# Patient Record
Sex: FEMALE | Race: WHITE | NOT HISPANIC OR LATINO | Employment: OTHER | URBAN - METROPOLITAN AREA
[De-identification: names, ages, dates, MRNs, and addresses within clinical notes are randomized per-mention and may not be internally consistent; named-entity substitution may affect disease eponyms.]

---

## 2024-07-24 ENCOUNTER — OFFICE VISIT (OUTPATIENT)
Dept: NEPHROLOGY | Facility: CLINIC | Age: 64
End: 2024-07-24
Payer: COMMERCIAL

## 2024-07-24 VITALS
DIASTOLIC BLOOD PRESSURE: 80 MMHG | BODY MASS INDEX: 26.2 KG/M2 | SYSTOLIC BLOOD PRESSURE: 132 MMHG | HEIGHT: 66 IN | WEIGHT: 163 LBS

## 2024-07-24 DIAGNOSIS — E78.5 HYPERLIPIDEMIA, UNSPECIFIED HYPERLIPIDEMIA TYPE: ICD-10-CM

## 2024-07-24 DIAGNOSIS — E22.2 SIADH (SYNDROME OF INAPPROPRIATE ADH PRODUCTION) (HCC): ICD-10-CM

## 2024-07-24 DIAGNOSIS — N18.1 CKD (CHRONIC KIDNEY DISEASE) STAGE 1, GFR 90 ML/MIN OR GREATER: ICD-10-CM

## 2024-07-24 DIAGNOSIS — E87.1 CHRONIC HYPONATREMIA: Primary | ICD-10-CM

## 2024-07-24 DIAGNOSIS — E55.9 VITAMIN D DEFICIENCY: ICD-10-CM

## 2024-07-24 PROCEDURE — 99214 OFFICE O/P EST MOD 30 MIN: CPT | Performed by: INTERNAL MEDICINE

## 2024-07-24 NOTE — PROGRESS NOTES
Nephrology Follow up Consultation  Rose ESPOSITO 63 y.o. female MRN: [id number]            BACKGROUND:  Rose ESPOSITO is a 63 y.o.female who was referred by Chong López MD for evaluation of Follow-up and Hyponatremia  .      ASSESSMENT / PLAN:   63 y.o.  female with pmh of multiple co-morbidities including multiple sclerosis, colostomy reversal, SIADH, anemia, GERD, hypertension and chronic hyponatremia  presents to the office for follow-up.      chronic hyponatremia CKD stage 1:  Baseline sodium 131-139meq  Most recent sodium level was 130 meq at 10 AM on 6/20/2024,  Etiology of hyponatremia likely due to  medication induced (Trileptal, Cymbalta )+ SIADH + increased ADH secondary to pain  Reports has been on salt tab for more than 10 yrs and was on 8gm a day and stable and every time salt tablet dosage is decreased even slightly her serum sodium drops drastically.  Wishes to continue on the 8 g without any change.  I respect her decision risks benefits renal standpoint everything discussed in depth.  Discussed with patient as long as serum sodium stays above 130 she can continue her Cymbalta and Trileptal if continues to drop then we may need to consider changes in medication regimen  plasma osmolality = 269  urine osmolality = 302  urine sodium = 91  uric acid = 2.1  Chest x-ray negative no acute signs suggestive of SIADH  Hold off on pan screening as patient has 2 offensive drugs likely causing SIADH.  Currently on salt tablets 2 g p.o. every 6 hours   continue on fluid restriction of 1.5L/day  Strict I/O.  Continue blood work every 3 months  CT abdomen 3/8/2023 benign cysts on kidneys no acute pathology suggestive of malignancy.  Check renal function panel monthly for now  Baseline Creatinine of 0.5-0.7 mg/dL  Most recent creatinine is 0.53 Mg/dL on 6/20/24  - Clinically the patient appears to be euvolemic  - Recommend to avoid use of NSAIDs, nephrotoxins. Caution advised with regards to exposure to IV  contrast dye.   - Discussed with the patient in depth her renal status, including the possible etiologies for CKD.   - Advised the patient that when her GFR is close to 20mL/min then will start discussing about RRT(renal replacement therapy) options such as renal transplant, peritoneal dialysis and hemodialysis.   - Informed the patient about the various options for Renal Replacement therapy.  - Discussed with the patient how we need to work together to delay the progression of CKD with optimal BP control based on their age and co-morbidities and trying to reduce proteinuria by the use of anti-proteinuric agents.     Blood pressure:  BP Readings from Last 3 Encounters:   07/24/24 132/80   06/04/24 140/88   04/16/24 138/86     - Patient is on no medications.   -Did discuss possible addition of Lasix and decreasing salt tablets her blood pressure is elevated however she wishes to hold off I respect and agree with them for now  - Goal BP of < 130/80 based on age and comorbidities  - Instructed to follow low sodium (2gm)diet.    Hemoglobin:  - Goal Hb of 10-12 g/dL  - Most recent labs suggestive of 14.2 g/dL.   -Continue p.o. iron    CKD-MBD(Mineral Bone Disease):  - Based on patients CKD stage following is the goal of therapy.  - Maintain calcium phosphorus product of < 55.  - Continue patient on vitamin D 1000 units p.o. daily  -Check Vitamin D prior to next visit    Proteinuria:  - most recent UA from August 2023 positive protein will recheck concentrated specimen  - check protein creatinine ratio, UA  - currently on therapy for proteinuria with Lipitor    Lipids:  -On Lipitor  - goal LDL less than 70  - management per primary team    Trigeminal neuralgia:  - management as per Primary team  - on Cymbalta, Trileptal  -Discussed with patient as long as serum sodium stays above 130 she can continue these medications without adjustments    GERD:  - Management as per primary team  -On Pepcid    Nutrition:  - Encouraged  "patient to follow a renal diet comprising of moderate potassium, low phosphorus and protein restriction to 0.8gm/kg.  - Will check serum albumin with next blood work.     Followup:  - Patient is to follow-up in 12 months, with lab work to be performed every 3 months for now a few days prior to the next visit. Advised patient to call me in 10 days to review the results if they do not hear back from me, as I may have not received the results.    Lianet Leyva MD, [facility], 7/24/2024, 1:22 PM             SUBJECTIVE: 63 y.o. female presents to the office for follow-up presents with spouse feels well no complaints did have issues with pain within trigeminal neuralgia at the time when she had the blood work done likely contributed to increased ADH and sodium of 130.  Occasionally when she feels like her sodium is dropping she may take additional salt tablets close to 9gm a day.  Wishes not to make any further changes to medications at this time.    Review of Systems   Constitutional:  Negative for chills and fever.   HENT:  Negative for congestion.    Respiratory:  Negative for cough, shortness of breath and wheezing.    Cardiovascular:  Negative for leg swelling.   Gastrointestinal:  Negative for abdominal pain.   Genitourinary:  Negative for difficulty urinating and dysuria.   Musculoskeletal:  Negative for back pain.   Neurological:  Negative for dizziness and headaches.   Psychiatric/Behavioral:  Negative for agitation and confusion.    All other systems reviewed and are negative.      PAST MEDICAL HISTORY:  Past Medical History:   Diagnosis Date   • Anemia    • Angioedema 11/29/2022   • Balance problem     per pt related to her \"MS\"   • Bladder infection     1/3/23 per pt no longer a problem\"   • Braces as ambulation aid     right ankle/leg when walking   • Cellulitis of right foot 11/17/2022   • Cognitive impairment 11/17/2022    Some short-term impairment   • Colostomy in place (HCC)    • Essential hypertension " "2020   • Family history of breast cancer     per pt \"Mom  from it\"   • Gait instability    • GERD  2018   • History of CVA (cerebrovascular accident) 2022    4mini strokes found on MRI.  Not hospitalized.  Early .   • History of transfusion    • Hyperlipidemia 2020   • Hyponatremia    • IBS (irritable bowel syndrome)    • Ischemic colitis ([facility]) 2022   • Mini stroke    • Multiple sclerosis ([facility])    • Poor vision     Secondary to MS   • Right foot drop    • Risk for falls    • SIADH (syndrome of inappropriate ADH production) ([facility]) 2015   • Trigeminal neuralgia of right side of face 03/10/2014   • Vitamin D deficiency    • Walker as ambulation aid        PROBLEM LIST    Patient Active Problem List   Diagnosis   • IBS (irritable bowel syndrome)   • Multiple sclerosis   • SIADH (syndrome of inappropriate ADH production) ([facility])   • Vitamin D deficiency   • GERD    • Gait instability   • Hyperlipidemia   • History of CVA (cerebrovascular accident)   • Iron deficiency anemia   • Cognitive impairment   • Cavovarus deformity of foot, acquired, right   • Chronic anemia   • Trigeminal neuralgia   • Venous stasis dermatitis of right lower extremity   • Chronic hyponatremia   • CKD (chronic kidney disease) stage 1, GFR 90 ml/min or greater       PAST SURGICAL HISTORY:  Past Surgical History:   Procedure Laterality Date   • ABDOMINAL ADHESION SURGERY N/A 2023    Procedure: EXTENSIVE LYSIS OF ADHESIONS;  Surgeon: Odette Bonilla MD;  Location:  MAIN OR;  Service: General   • ACHILLES TENDON SURGERY Right 2023    Procedure: TENOTOMY PERCUTANEOUS ACHILLES, Talonavicular joint capsule release, posterior tibial tendon lengthening, application of circular frame;  Surgeon: James R Lachman, MD;  Location:  MAIN OR;  Service: Orthopedics   • ANKLE SURGERY     • COLON SURGERY  2023   • COLONOSCOPY N/A 2023    Procedure: INTRAOP FLEXIBLE COLONOSCOPY;  Surgeon: Odette" RADHA Bonilla MD;  [facility];  Service: General   • EXPLORATORY LAPAROTOMY W/ BOWEL RESECTION N/A 11/09/2022    Procedure: LAPAROTOMY EXPLORATORY W/ BOWEL RESECTION; APPLICATION ABTHERA VAC;  Surgeon: Heriberto Shay DO;  [facility];  Service: General   • EXTERNAL FIXATOR (EX FIX) REMOVAL Right 02/02/2023    Procedure: REMOVAL EXTERNAL FIXATOR (EX FIX) Right lower extremity;  Surgeon: James R Lachman, MD;  [facility];  Service: Orthopedics   • HIP SURGERY      jeri implanted right thigh and screw right hip-right knee screw   • LAPAROTOMY N/A 11/10/2022    Procedure: LAPAROTOMY EXPLORATORY- SECOND LOOK, ABDOMINAL WOUND CLOSURE, CREATION OF A COLOSTOMY;  Surgeon: Mateo Sargent MD;  [facility];  Service: General   • IN CREATE LESION STRTCTC PRQ NEUROLYTIC GASSERIAN Left 06/21/2016    Procedure:  V3 TRIGEMINAL RF RHIZOTOMY;  Surgeon: Bird Rosa MD;  [facility];  Service: Neurosurgery   • IN CREATE LESION STRTCTC PRQ NEUROLYTIC GASSERIAN Left 01/15/2019    Procedure: RHIZOTOMY TRIGEMINAL NERVES (V2 & V3), LEFT;  Surgeon: Ac Pemberton MD;  [facility];  Service: Neurosurgery   • IN CREATE LESION STRTCTC PRQ NEUROLYTIC GASSERIAN Right 04/02/2019    Procedure: RHIZOTOMY TRIGEMINAL NERVES (V2 & V3), RIGHT;  Surgeon: Ac Pemberton MD;  [facility];  Service: Neurosurgery   • IN DSTRJ TRIGEMINAL NRV SUPRAORB INFRAORB BRANCH Right 03/11/2016    Procedure: RIGHT SIDED  TRIGEMINAL V2 RADIOFREQUENCY RHIZOTOMY;  Surgeon: Bird Rosa MD;  [facility];  Service: Neurosurgery   • IN LAPS CLSR NTRSTM LG/SM INT W/RESCJ & ANASTOMOSIS N/A 8/9/2023    Procedure: LAPAROSCOPIC-CONVERTED TO OPEN CLOSURE COLOSTOMY, HEPATIC FLEXURE MOBILIZATION, COLORECTAL ANASTOMOSIS;  Surgeon: Odette Bonilla MD;  [facility];  Service: General   • IN SIGMOIDOSCOPY FLX DX W/COLLJ SPEC BR/WA IF PFRMD N/A 11/09/2022    Procedure: Flexible sigmoidoscopy,   "diagnostic colonoscopy;  Surgeon: Heriberto Shay DO;  [facility];  Service: General   • RHIZOTOMY Left    • TONSILLECTOMY AND ADENOIDECTOMY     • TUBAL LIGATION         SOCIAL HISTORY :   reports that she quit smoking about 34 years ago. Her smoking use included cigarettes. She started smoking about 39 years ago. She has a 2.5 pack-year smoking history. She has never used smokeless tobacco. She reports that she does not currently use alcohol. She reports that she does not use drugs.    FAMILY HISTORY:  Family History   Problem Relation Age of Onset   • Breast cancer Mother    • No Known Problems Daughter        ALLERGIES:  Allergies   Allergen Reactions   • Ace Inhibitors Angioedema     Hx of angioedema - unclear if caused by ACE inhibitors, but highly recommend avoiding.   • Avonex [Interferon Beta-1a] Other (See Comments)     Per pt \"Med for MS passed out-fainted\"   • Cephalosporins Swelling     DO NOT GIVE BETA LACTAMS.Patient with delayed angioedema, cefepime with potential cause.  Additionally other medications like Lovenox also considered. Then with second episode of angioedema, less intense than previous, again delayed response after again having a dose of Cefepime   • Penicillins Rash     DO NOT GIVE BETA LACTAMS. Extensive total body rash occurring after Zosyn. Patient reported previous rash, mild, with Keflex.   • Keppra [Levetiracetam] Itching and Other (See Comments)     syncope   • Bactrim [Sulfamethoxazole-Trimethoprim] Itching     Other reaction(s): Itchy all over and rash.   • Barium Sulfate Rash and Edema   • Keppra [Levetiracetam] Rash and Edema   • Vancomycin Hives     2/2023 admission patient developed delayed hypersensitivity with rash and then subsequent angioedema on 2/22/2023.  Higher suspicion for reaction to cephalosporins.  Could consider retrial of vancomycin in the future.           PHYSICAL EXAM:  Vitals:    07/24/24 1300   BP: 132/80   BP Location: Left arm   Patient " "Position: Sitting   Cuff Size: Large   Weight: 73.9 kg (163 lb)   Height: 5' 6\" (1.676 m)       Body mass index is 26.31 kg/m².    Physical Exam  Vitals reviewed.   Constitutional:       General: She is not in acute distress.     Appearance: Normal appearance. She is normal weight. She is not ill-appearing, toxic-appearing or diaphoretic.   HENT:      Head: Normocephalic and atraumatic.      Mouth/Throat:      Mouth: Mucous membranes are moist.      Pharynx: No oropharyngeal exudate.   Eyes:      General: No scleral icterus.  Cardiovascular:      Rate and Rhythm: Normal rate.   Pulmonary:      Effort: No respiratory distress.      Breath sounds: Normal breath sounds. No stridor. No wheezing.   Abdominal:      Palpations: There is no mass.      Tenderness: There is no abdominal tenderness. There is no right CVA tenderness or left CVA tenderness.   Musculoskeletal:         General: No swelling.      Cervical back: Normal range of motion. No rigidity.   Skin:     Coloration: Skin is not jaundiced.   Neurological:      General: No focal deficit present.      Mental Status: She is alert and oriented to person, place, and time. Mental status is at baseline.   Psychiatric:         Mood and Affect: Mood normal.         Behavior: Behavior normal.         LABORATORY DATA:     Results from last 6 Months   Lab Units 06/20/24  0958 04/11/24  1614 02/23/24  1642 02/17/24  0931 02/15/24  2204 02/15/24  1310 02/15/24  1306   WBC Thousand/uL  --   --   --  8.42  --  4.98  --    HEMOGLOBIN g/dL  --   --   --  14.2  --  12.7  --    I STAT HEMOGLOBIN g/dl  --   --   --   --   --   --  12.9   HEMATOCRIT %  --   --   --  41.8  --  36.4*  --    HEMATOCRIT, ISTAT %  --   --   --   --   --   --  38   PLATELETS Thousands/uL  --   --   --  342  --  291  --    POTASSIUM mmol/L 4.0 4.0 4.8 4.0   < > 5.1  --    CHLORIDE mmol/L 97 94* 96 95*   < > 92*  --    CO2 mmol/L 24 30 30 24   < > 21  --    CO2, I-STAT mmol/L  --   --   --   --   --   --  " 27   BUN mg/dL 8 10 7 6   < > 5  --    CREATININE mg/dL 0.53* 0.52* 0.50* 0.57*   < > 0.50*  --    CALCIUM mg/dL 9.8 9.8 10.1 9.4   < > 9.0  --    MAGNESIUM mg/dL  --   --   --   --   --  2.1  --    PHOSPHORUS mg/dL 3.3 3.4  --   --   --  3.2  --    GLUCOSE, ISTAT mg/dl  --   --   --   --   --   --  132    < > = values in this interval not displayed.          rest all reviewed    RADIOLOGY:  No orders to display     Rest all reviewed        MEDICATIONS:    Current Outpatient Medications:   •  acetaminophen (TYLENOL) 325 mg tablet, Take 2 tablets (650 mg total) by mouth every 6 (six) hours as needed for mild pain, Disp: , Rfl: 0  •  atorvastatin (LIPITOR) 40 mg tablet, TAKE 1 TABLET BY MOUTH EVERY DAY IN THE EVENING, Disp: 90 tablet, Rfl: 3  •  Banophen 25 MG capsule, TAKE 1 TABLET (25 MG TOTAL) BY MOUTH EVERY 8 (EIGHT) HOURS AS NEEDED FOR ITCHING OR ALLERGIES, Disp: 100 capsule, Rfl: 5  •  Bioflavonoid Products (Vitamin C ER) 1000-100 MG TBCR, Take 1,000 mg by mouth 1 (one) time, Disp: , Rfl:   •  Cholecalciferol (Vitamin D3) 1000 units CAPS, Take 1,000 Units by mouth 1 (one) time, Disp: , Rfl:   •  clopidogrel (PLAVIX) 75 mg tablet, TAKE 1 TABLET BY MOUTH EVERY DAY, Disp: 90 tablet, Rfl: 3  •  DULoxetine (CYMBALTA) 60 mg delayed release capsule, Take 1 capsule (60 mg total) by mouth daily, Disp: 90 capsule, Rfl: 3  •  famotidine (PEPCID) 20 mg tablet, TAKE 1 TABLET BY MOUTH TWICE A DAY, Disp: 180 tablet, Rfl: 3  •  ferrous sulfate 325 (65 Fe) mg tablet, 1 tablet every other day, Disp: 45 tablet, Rfl: 0  •  OXcarbazepine (TRILEPTAL) 600 mg tablet, Take 1 tablet (600 mg total) by mouth every 8 (eight) hours, Disp: 270 tablet, Rfl: 3  •  oxybutynin (DITROPAN) 5 mg tablet, TAKE 1 TABLET BY MOUTH TWICE A DAY, Disp: 180 tablet, Rfl: 1  •  sodium chloride 1 g tablet, Take 2 tablets (2 g total) by mouth every 6 (six) hours, Disp: , Rfl:   •  sodium chloride (OCEAN) 0.65 % nasal spray, 1 spray into each nostril every 4  "(four) hours (Patient not taking: Reported on 7/24/2024), Disp: 60 mL, Rfl: 0          Portions of the record may have been created with voice recognition software. Occasional wrong word or \"sound a like\" substitutions may have occurred due to the inherent limitations of voice recognition software. Read the chart carefully and recognize, using context, where substitutions have occurred.If you have any questions, please contact the dictating provider.      "

## 2024-07-24 NOTE — PATIENT INSTRUCTIONS
- get blood work every 3months    - Please call me in 10 days after having your blood work done to review the results if you do not hear back from me or my office, as I may have not received the results.  - please remember to perform blood work prior to the next visit.  - Please call if the blood pressure top number is greater than 140 or less than 110 consistently.  - Please call if you are gaining more than 2lbs in 2 days for adjustment of water pills.  ~ Please AVOID the following pain medications.  LIST OF NSAIDS (NONSTEROIDAL ANTI-INFLAMMATORY DRUGS) AND SUTTON-2 INHIBITORS    DIFLUNISAL (DOLOBID)  IBUPROFEN (MOTRIN, ADVIL)  FLURBIPROFEN (ANSAID)  KETOPROFEN (ORUDIS, ORUVAIL)  FENOPROFEN (NALFON)  NABUMETONE (RELAFEN)  PIROXICAM (FELDENE)  NAPROXEN (ALEVE, NAPROSYN, NAPRELAN, ANAPROX)  DICLOFENAC (VOLTAREN, CATAFLAM)  INDOMETHACIN (INDOCIN)  SULINDAC (CLINORIL)  TOLMETIN (TOLETIN)  ETODOLAC (LODINE)  MELOXICAM (MOBIC)  KETOROLAC (TORADOL)  OXAPROZIN (DAYPRO)  CELECOXIB (CELEBREX)

## 2024-08-05 ENCOUNTER — TELEPHONE (OUTPATIENT)
Dept: FAMILY MEDICINE CLINIC | Facility: CLINIC | Age: 64
End: 2024-08-05

## 2024-08-05 DIAGNOSIS — R39.9 UTI SYMPTOMS: Primary | ICD-10-CM

## 2024-08-05 NOTE — TELEPHONE ENCOUNTER
Patients spouse called and stated patient is experiencing UTI symptoms can he come and get the necessary items to bring back for his spouse to have her urine tested

## 2024-08-06 ENCOUNTER — LAB (OUTPATIENT)
Dept: LAB | Facility: CLINIC | Age: 64
End: 2024-08-06
Payer: COMMERCIAL

## 2024-08-06 DIAGNOSIS — R39.9 UTI SYMPTOMS: ICD-10-CM

## 2024-08-06 DIAGNOSIS — N30.01 ACUTE CYSTITIS WITH HEMATURIA: Primary | ICD-10-CM

## 2024-08-06 LAB
BACTERIA UR QL AUTO: ABNORMAL /HPF
BILIRUB UR QL STRIP: NEGATIVE
CLARITY UR: CLEAR
COLOR UR: ABNORMAL
GLUCOSE UR STRIP-MCNC: NEGATIVE MG/DL
HGB UR QL STRIP.AUTO: ABNORMAL
KETONES UR STRIP-MCNC: NEGATIVE MG/DL
LEUKOCYTE ESTERASE UR QL STRIP: ABNORMAL
MUCOUS THREADS UR QL AUTO: ABNORMAL
NITRITE UR QL STRIP: POSITIVE
NON-SQ EPI CELLS URNS QL MICRO: ABNORMAL /HPF
PH UR STRIP.AUTO: 7 [PH]
PROT UR STRIP-MCNC: ABNORMAL MG/DL
RBC #/AREA URNS AUTO: ABNORMAL /HPF
SP GR UR STRIP.AUTO: 1.01 (ref 1–1.03)
UROBILINOGEN UR STRIP-ACNC: <2 MG/DL
WBC #/AREA URNS AUTO: ABNORMAL /HPF

## 2024-08-06 PROCEDURE — 82043 UR ALBUMIN QUANTITATIVE: CPT

## 2024-08-06 PROCEDURE — 81001 URINALYSIS AUTO W/SCOPE: CPT

## 2024-08-06 PROCEDURE — 87186 SC STD MICRODIL/AGAR DIL: CPT

## 2024-08-06 PROCEDURE — 82570 ASSAY OF URINE CREATININE: CPT

## 2024-08-06 PROCEDURE — 87077 CULTURE AEROBIC IDENTIFY: CPT

## 2024-08-06 PROCEDURE — 87086 URINE CULTURE/COLONY COUNT: CPT

## 2024-08-06 RX ORDER — NITROFURANTOIN MACROCRYSTALS 100 MG/1
100 CAPSULE ORAL 2 TIMES DAILY
Qty: 14 CAPSULE | Refills: 0 | Status: SHIPPED | OUTPATIENT
Start: 2024-08-06 | End: 2024-08-13

## 2024-08-06 NOTE — RESULT ENCOUNTER NOTE
Advise that urinalysis looks like an infection is present.  Begin Macrobid 100 mg twice daily for 1 week pending the results of the urine culture.

## 2024-08-08 LAB — BACTERIA UR CULT: ABNORMAL

## 2024-09-02 DIAGNOSIS — G50.0 TRIGEMINAL NEURALGIA OF LEFT SIDE OF FACE: ICD-10-CM

## 2024-09-03 RX ORDER — OXCARBAZEPINE 600 MG/1
600 TABLET, FILM COATED ORAL EVERY 8 HOURS
Qty: 270 TABLET | Refills: 3 | Status: SHIPPED | OUTPATIENT
Start: 2024-09-03

## 2024-09-03 RX ORDER — OXCARBAZEPINE 600 MG/1
600 TABLET, FILM COATED ORAL EVERY 8 HOURS
Qty: 90 TABLET | Refills: 0 | Status: SHIPPED | OUTPATIENT
Start: 2024-09-03 | End: 2024-09-03 | Stop reason: SDUPTHER

## 2024-09-09 DIAGNOSIS — N32.81 OVERACTIVE BLADDER: ICD-10-CM

## 2024-09-09 RX ORDER — OXYBUTYNIN CHLORIDE 5 MG/1
5 TABLET ORAL 2 TIMES DAILY
Qty: 180 TABLET | Refills: 1 | Status: SHIPPED | OUTPATIENT
Start: 2024-09-09

## 2024-09-17 ENCOUNTER — OFFICE VISIT (OUTPATIENT)
Dept: FAMILY MEDICINE CLINIC | Facility: CLINIC | Age: 64
End: 2024-09-17
Payer: COMMERCIAL

## 2024-09-17 VITALS
TEMPERATURE: 98 F | OXYGEN SATURATION: 98 % | HEART RATE: 83 BPM | SYSTOLIC BLOOD PRESSURE: 170 MMHG | HEIGHT: 66 IN | RESPIRATION RATE: 18 BRPM | DIASTOLIC BLOOD PRESSURE: 90 MMHG | WEIGHT: 163 LBS | BODY MASS INDEX: 26.2 KG/M2

## 2024-09-17 DIAGNOSIS — E87.1 CHRONIC HYPONATREMIA: ICD-10-CM

## 2024-09-17 DIAGNOSIS — K21.9 GASTROESOPHAGEAL REFLUX DISEASE, UNSPECIFIED WHETHER ESOPHAGITIS PRESENT: Chronic | ICD-10-CM

## 2024-09-17 DIAGNOSIS — Z86.73 HISTORY OF CVA (CEREBROVASCULAR ACCIDENT): ICD-10-CM

## 2024-09-17 DIAGNOSIS — R26.81 GAIT INSTABILITY: ICD-10-CM

## 2024-09-17 DIAGNOSIS — Z23 NEED FOR COVID-19 VACCINE: ICD-10-CM

## 2024-09-17 DIAGNOSIS — G50.0 TRIGEMINAL NEURALGIA: Primary | ICD-10-CM

## 2024-09-17 DIAGNOSIS — E22.2 SIADH (SYNDROME OF INAPPROPRIATE ADH PRODUCTION) (HCC): ICD-10-CM

## 2024-09-17 PROCEDURE — 99214 OFFICE O/P EST MOD 30 MIN: CPT | Performed by: FAMILY MEDICINE

## 2024-09-17 PROCEDURE — 91320 SARSCV2 VAC 30MCG TRS-SUC IM: CPT

## 2024-09-17 PROCEDURE — 90480 ADMN SARSCOV2 VAC 1/ONLY CMP: CPT

## 2024-09-17 NOTE — PATIENT INSTRUCTIONS
Looks great.  Medications stay the same.  COVID update.  Flu shot in October.  Recheck in 3 months.

## 2024-09-17 NOTE — PROGRESS NOTES
"Chief Complaint   Patient presents with    Follow-up        HPI   Here for  follow-up of hypertension, GERD, hyponatremia, hyperlipidemia, MS, SI ADH, ambulatory dysfunction, and trigeminal neuralgia.  When last seen, was concerned about recurrence of trigeminal neuralgia but symptoms resolved.  States that she is doing well.  Walking with a walker and brace.  Notes that her ostomy site healed up well.  Does get rectal urgency.  Stools are not formed.  Did not like the generic form of Metamucil.  Currently on 9 tablets of sodium with improvement.        Past Medical History:   Diagnosis Date    Anemia     Angioedema 2022    Balance problem     per pt related to her \"MS\"    Bladder infection     1/3/23 per pt no longer a problem\"    Braces as ambulation aid     right ankle/leg when walking    Cellulitis of right foot 2022    Cognitive impairment 2022    Some short-term impairment    Colostomy in place ([facility])     Essential hypertension 2020    Family history of breast cancer     per pt \"Mom  from it\"    Gait instability     GERD  2018    History of CVA (cerebrovascular accident) 2022    4mini strokes found on MRI.  Not hospitalized.  Early .    History of transfusion     Hyperlipidemia 2020    Hyponatremia     IBS (irritable bowel syndrome)     Ischemic colitis ([facility]) 2022    Mini stroke     Multiple sclerosis ([facility])     Poor vision     Secondary to MS    Right foot drop     Risk for falls     SIADH (syndrome of inappropriate ADH production) ([facility]) 2015    Trigeminal neuralgia of right side of face 03/10/2014    Vitamin D deficiency     Walker as ambulation aid         Past Surgical History:   Procedure Laterality Date    ABDOMINAL ADHESION SURGERY N/A 2023    Procedure: EXTENSIVE LYSIS OF ADHESIONS;  Surgeon: Odette Bonilla MD;  [facility];  Service: General    ACHILLES TENDON SURGERY Right 2023    Procedure: TENOTOMY PERCUTANEOUS " ACHILLES, Talonavicular joint capsule release, posterior tibial tendon lengthening, application of circular frame;  Surgeon: James R Lachman, MD;  [facility];  Service: Orthopedics    ANKLE SURGERY      COLON SURGERY  11 09 2023    COLONOSCOPY N/A 8/9/2023    Procedure: INTRAOP FLEXIBLE COLONOSCOPY;  Surgeon: Odette Bonilla MD;  [facility];  Service: General    EXPLORATORY LAPAROTOMY W/ BOWEL RESECTION N/A 11/09/2022    Procedure: LAPAROTOMY EXPLORATORY W/ BOWEL RESECTION; APPLICATION ABTHERA VAC;  Surgeon: Heriberto Shay DO;  [facility];  Service: General    EXTERNAL FIXATOR (EX FIX) REMOVAL Right 02/02/2023    Procedure: REMOVAL EXTERNAL FIXATOR (EX FIX) Right lower extremity;  Surgeon: James R Lachman, MD;  [facility];  Service: Orthopedics    HIP SURGERY      jeri implanted right thigh and screw right hip-right knee screw    LAPAROTOMY N/A 11/10/2022    Procedure: LAPAROTOMY EXPLORATORY- SECOND LOOK, ABDOMINAL WOUND CLOSURE, CREATION OF A COLOSTOMY;  Surgeon: Mateo Sargent MD;  [facility];  Service: General    CO CREATE LESION STRTCTC PRQ NEUROLYTIC GASSERIAN Left 06/21/2016    Procedure:  V3 TRIGEMINAL RF RHIZOTOMY;  Surgeon: Bird Rosa MD;  [facility];  Service: Neurosurgery    CO CREATE LESION STRTCTC PRQ NEUROLYTIC GASSERIAN Left 01/15/2019    Procedure: RHIZOTOMY TRIGEMINAL NERVES (V2 & V3), LEFT;  Surgeon: Ac Pemberton MD;  [facility];  Service: Neurosurgery    CO CREATE LESION STRTCTC PRQ NEUROLYTIC GASSERIAN Right 04/02/2019    Procedure: RHIZOTOMY TRIGEMINAL NERVES (V2 & V3), RIGHT;  Surgeon: Ac Pemberton MD;  [facility];  Service: Neurosurgery    CO DSTRJ TRIGEMINAL NRV SUPRAORB INFRAORB BRANCH Right 03/11/2016    Procedure: RIGHT SIDED  TRIGEMINAL V2 RADIOFREQUENCY RHIZOTOMY;  Surgeon: Bird Rosa MD;  [facility];  Service: Neurosurgery    CO LAPS CLSR NTRSTM LG/SM INT W/RESCJ & ANASTOMOSIS  "N/A 2023    Procedure: LAPAROSCOPIC-CONVERTED TO OPEN CLOSURE COLOSTOMY, HEPATIC FLEXURE MOBILIZATION, COLORECTAL ANASTOMOSIS;  Surgeon: Odette Bonilla MD;  [facility];  Service: General    AZ SIGMOIDOSCOPY FLX DX W/COLLJ SPEC BR/WA IF PFRMD N/A 2022    Procedure: Flexible sigmoidoscopy,  diagnostic colonoscopy;  Surgeon: Heriberto Shay DO;  [facility];  Service: General    RHIZOTOMY Left     TONSILLECTOMY AND ADENOIDECTOMY      TUBAL LIGATION         Social History     Tobacco Use    Smoking status: Former     Current packs/day: 0.00     Average packs/day: 0.5 packs/day for 5.0 years (2.5 ttl pk-yrs)     Types: Cigarettes     Start date: 1985     Quit date: 1990     Years since quittin.7    Smokeless tobacco: Never   Substance Use Topics    Alcohol use: Not Currently       Social History     Social History Narrative     With Milton since . .     32-year-old daughter, Angélica.    He works at [employer].    On disability for MS for a long time.    Got  26 after a 20 year romance.        The following portions of the patient's history were reviewed and updated as appropriate: allergies, current medications, past family history, past medical history, past social history, past surgical history, and problem list.      Review of Systems       /90 (BP Location: Left arm, Patient Position: Sitting, Cuff Size: Standard)   Pulse 83   Temp 98 °F (36.7 °C) (Temporal)   Resp 18   Ht 5' 6\" (1.676 m)   Wt 73.9 kg (163 lb)   LMP 2011   SpO2 98%   BMI 26.31 kg/m²      Physical Exam   Appears better than ever.  Lungs are clear.  Heart regular.  Abdomen soft and nontender.  Well-healed scars noted.  Mood is upbeat.  Affect appropriate.              Current Outpatient Medications:     acetaminophen (TYLENOL) 325 mg tablet, Take 2 tablets (650 mg total) by mouth every 6 (six) hours as needed for mild pain, Disp: , Rfl: 0    atorvastatin (LIPITOR) 40 " mg tablet, TAKE 1 TABLET BY MOUTH EVERY DAY IN THE EVENING, Disp: 90 tablet, Rfl: 3    Banophen 25 MG capsule, TAKE 1 TABLET (25 MG TOTAL) BY MOUTH EVERY 8 (EIGHT) HOURS AS NEEDED FOR ITCHING OR ALLERGIES, Disp: 100 capsule, Rfl: 5    Bioflavonoid Products (Vitamin C ER) 1000-100 MG TBCR, Take 1,000 mg by mouth 1 (one) time, Disp: , Rfl:     Cholecalciferol (Vitamin D3) 1000 units CAPS, Take 1,000 Units by mouth 1 (one) time, Disp: , Rfl:     clopidogrel (PLAVIX) 75 mg tablet, TAKE 1 TABLET BY MOUTH EVERY DAY, Disp: 90 tablet, Rfl: 3    DULoxetine (CYMBALTA) 60 mg delayed release capsule, Take 1 capsule (60 mg total) by mouth daily, Disp: 90 capsule, Rfl: 3    famotidine (PEPCID) 20 mg tablet, TAKE 1 TABLET BY MOUTH TWICE A DAY, Disp: 180 tablet, Rfl: 3    ferrous sulfate 325 (65 Fe) mg tablet, 1 tablet every other day, Disp: 45 tablet, Rfl: 0    OXcarbazepine (TRILEPTAL) 600 mg tablet, Take 1 tablet (600 mg total) by mouth every 8 (eight) hours, Disp: 270 tablet, Rfl: 3    oxybutynin (DITROPAN) 5 mg tablet, TAKE 1 TABLET BY MOUTH TWICE A DAY, Disp: 180 tablet, Rfl: 1    sodium chloride 1 g tablet, Take 2 tablets (2 g total) by mouth every 6 (six) hours, Disp: , Rfl:     sodium chloride (OCEAN) 0.65 % nasal spray, 1 spray into each nostril every 4 (four) hours (Patient not taking: Reported on 7/24/2024), Disp: 60 mL, Rfl: 0     No problem-specific Assessment & Plan notes found for this encounter.       Diagnoses and all orders for this visit:    Trigeminal neuralgia    History of CVA (cerebrovascular accident)    Chronic hyponatremia    SIADH (syndrome of inappropriate ADH production) ([facility])    Gastroesophageal reflux disease, unspecified whether esophagitis present    Gait instability    Need for COVID-19 vaccine  -     COVID-19 Pfizer mRNA vaccine 12 yr and older (Comirnaty pre-filled syringe)        Patient Instructions   Looks great.  Medications stay the same.  COVID update.  Flu shot in October.  Recheck in 3  months.

## 2024-09-26 DIAGNOSIS — Z86.73 HISTORY OF CVA (CEREBROVASCULAR ACCIDENT): ICD-10-CM

## 2024-09-26 DIAGNOSIS — G50.0 TRIGEMINAL NEURALGIA: ICD-10-CM

## 2024-09-27 RX ORDER — CLOPIDOGREL BISULFATE 75 MG/1
75 TABLET ORAL DAILY
Qty: 90 TABLET | Refills: 1 | Status: SHIPPED | OUTPATIENT
Start: 2024-09-27

## 2024-09-27 RX ORDER — DULOXETIN HYDROCHLORIDE 60 MG/1
60 CAPSULE, DELAYED RELEASE ORAL DAILY
Qty: 90 CAPSULE | Refills: 1 | Status: SHIPPED | OUTPATIENT
Start: 2024-09-27

## 2024-10-30 ENCOUNTER — HOSPITAL ENCOUNTER (OUTPATIENT)
Dept: RADIOLOGY | Facility: HOSPITAL | Age: 64
Discharge: HOME | End: 2024-10-30
Payer: COMMERCIAL

## 2024-10-30 ENCOUNTER — OFFICE VISIT (OUTPATIENT)
Dept: GASTROENTEROLOGY | Facility: CLINIC | Age: 64
End: 2024-10-30
Payer: COMMERCIAL

## 2024-10-30 VITALS
WEIGHT: 163 LBS | HEIGHT: 66 IN | SYSTOLIC BLOOD PRESSURE: 122 MMHG | DIASTOLIC BLOOD PRESSURE: 78 MMHG | TEMPERATURE: 98.9 F | BODY MASS INDEX: 26.2 KG/M2

## 2024-10-30 DIAGNOSIS — K59.00 CONSTIPATION, UNSPECIFIED CONSTIPATION TYPE: Primary | ICD-10-CM

## 2024-10-30 DIAGNOSIS — R19.4 CHANGE IN BOWEL HABITS: ICD-10-CM

## 2024-10-30 DIAGNOSIS — Z87.19 HISTORY OF ISCHEMIC COLITIS: ICD-10-CM

## 2024-10-30 DIAGNOSIS — K59.00 CONSTIPATION, UNSPECIFIED CONSTIPATION TYPE: ICD-10-CM

## 2024-10-30 DIAGNOSIS — Z12.11 SCREENING FOR COLON CANCER: ICD-10-CM

## 2024-10-30 PROCEDURE — 99204 OFFICE O/P NEW MOD 45 MIN: CPT | Performed by: INTERNAL MEDICINE

## 2024-10-30 PROCEDURE — 74022 RADEX COMPL AQT ABD SERIES: CPT

## 2024-10-30 RX ORDER — POLYETHYLENE GLYCOL 3350, SODIUM SULFATE ANHYDROUS, SODIUM BICARBONATE, SODIUM CHLORIDE, POTASSIUM CHLORIDE 236; 22.74; 6.74; 5.86; 2.97 G/4L; G/4L; G/4L; G/4L; G/4L
4000 POWDER, FOR SOLUTION ORAL ONCE
Qty: 4000 ML | Refills: 0 | Status: SHIPPED | OUTPATIENT
Start: 2024-10-30 | End: 2024-10-30

## 2024-10-30 NOTE — PROGRESS NOTES
[facility] - Outpatient Note  Rose Hammer 63 y.o. female MRN: [id number]  Unit/Bed#:  Encounter: [id number]          ASSESSMENT AND PLAN:    Patient is a 63-year-old female with a past medical history of ischemic colitis s/p left hemicolectomy with transverse and descending colon removed in 2022 with revision in 2023, CKD, SIADH, GERD, and IBS who presents due to bloating and constipation.      Constipation  Change in bowel habits  Bloating  History of ischemic colitis s/p left hemicolectomy with revision  Patient is presenting for evaluation of change in bowel habits.  She has a history of ischemic colitis which developed in 2022 at that time she had a left hemicolectomy with an ostomy.  In 2023 she underwent a colonoscopy while having the ostomy however the exam was incomplete due to poor bowel prep.  In the end of 2023 patient had a revision and she no longer has an ostomy however she has noticed more difficulty with bowel movements.  She has increased feeling of bowel movements however she only has watery stool.  Her symptoms likely point towards overflow diarrhea, she would benefit from a x-ray obstruction series as well as a colonoscopy since prior 1 was incomplete and repeat was recommended within 1 year.    -X-ray with obstruction series to check stool burden  -Colonoscopy with 2-day GoLytely prep  -Can consider MiraLAX daily use if increase stool burden is seen on x-ray  FOLLOW-UP:  No follow-ups on file.    VISIT DIAGNOSES AND ORDERS:      No diagnosis found.  No orders of the defined types were placed in this encounter.        ______________________________________________________________________    HPI:   Patient is a 63-year-old female with a past medical history of ischemic colitis s/p left hemicolectomy with transverse and descending colon removed in 2022 with revision in 2023, CKD, SIADH, GERD, and IBS who presents due to bloating and constipation.    Patient is here as a new  "visit due to change in bowel habits that started couple of months ago.  Patient has a history of ischemic colitis which led to a left hemicolectomy with ostomy which was revised in .  Since the ostomy was removed patient endorses feeling like she has to have a bowel movement however is unable to go and when she does it is usually watery in nature. She denies any pain or discomfort does always feel bloated.  Patient denies any weight loss, blood, nausea, or vomiting.  Patient also endorses reflux symptoms which have been controlled with Pepcid and Tums.      REVIEW OF SYSTEMS:    10 point ROS reviewed and negative except otherwise noted in the HPI above.     Historical Information   Past Medical History:   Diagnosis Date    Anemia     Angioedema 2022    Balance problem     per pt related to her \"MS\"    Bladder infection     1/3/23 per pt no longer a problem\"    Braces as ambulation aid     right ankle/leg when walking    Cellulitis of right foot 2022    Cognitive impairment 2022    Some short-term impairment    Colostomy in place ([facility])     Essential hypertension 2020    Family history of breast cancer     per pt \"Mom  from it\"    Gait instability     GERD  2018    History of CVA (cerebrovascular accident) 2022    4mini strokes found on MRI.  Not hospitalized.  Early .    History of transfusion     Hyperlipidemia 2020    Hyponatremia     IBS (irritable bowel syndrome)     Ischemic colitis ([facility]) 2022    Mini stroke     Multiple sclerosis ([facility])     Poor vision     Secondary to MS    Right foot drop     Risk for falls     SIADH (syndrome of inappropriate ADH production) ([facility]) 2015    Trigeminal neuralgia of right side of face 03/10/2014    Vitamin D deficiency     Walker as ambulation aid      Past Surgical History:   Procedure Laterality Date    ABDOMINAL ADHESION SURGERY N/A 2023    Procedure: EXTENSIVE LYSIS OF ADHESIONS;  Surgeon: Odette GARCIA" MD Chad;  [facility];  Service: General    ACHILLES TENDON SURGERY Right 01/09/2023    Procedure: TENOTOMY PERCUTANEOUS ACHILLES, Talonavicular joint capsule release, posterior tibial tendon lengthening, application of circular frame;  Surgeon: James R Lachman, MD;  [facility];  Service: Orthopedics    ANKLE SURGERY      COLON SURGERY  11 09 2023    COLONOSCOPY N/A 8/9/2023    Procedure: INTRAOP FLEXIBLE COLONOSCOPY;  Surgeon: Odette Bonilla MD;  [facility];  Service: General    EXPLORATORY LAPAROTOMY W/ BOWEL RESECTION N/A 11/09/2022    Procedure: LAPAROTOMY EXPLORATORY W/ BOWEL RESECTION; APPLICATION ABTHERA VAC;  Surgeon: Heriberto Shay DO;  [facility];  Service: General    EXTERNAL FIXATOR (EX FIX) REMOVAL Right 02/02/2023    Procedure: REMOVAL EXTERNAL FIXATOR (EX FIX) Right lower extremity;  Surgeon: James R Lachman, MD;  [facility];  Service: Orthopedics    HIP SURGERY      jeri implanted right thigh and screw right hip-right knee screw    LAPAROTOMY N/A 11/10/2022    Procedure: LAPAROTOMY EXPLORATORY- SECOND LOOK, ABDOMINAL WOUND CLOSURE, CREATION OF A COLOSTOMY;  Surgeon: Mateo Sargent MD;  [facility];  Service: General    DC CREATE LESION STRTCTC PRQ NEUROLYTIC GASSERIAN Left 06/21/2016    Procedure:  V3 TRIGEMINAL RF RHIZOTOMY;  Surgeon: Bird Rosa MD;  [facility];  Service: Neurosurgery    DC CREATE LESION STRTCTC PRQ NEUROLYTIC GASSERIAN Left 01/15/2019    Procedure: RHIZOTOMY TRIGEMINAL NERVES (V2 & V3), LEFT;  Surgeon: Ac Pemberton MD;  [facility];  Service: Neurosurgery    DC CREATE LESION STRTCTC PRQ NEUROLYTIC GASSERIAN Right 04/02/2019    Procedure: RHIZOTOMY TRIGEMINAL NERVES (V2 & V3), RIGHT;  Surgeon: Ac Pemberton MD;  [facility];  Service: Neurosurgery    DC DSTRJ TRIGEMINAL NRV SUPRAORB INFRAORB BRANCH Right 03/11/2016    Procedure: RIGHT SIDED  TRIGEMINAL V2 RADIOFREQUENCY RHIZOTOMY;   Surgeon: Bird Rosa MD;  [facility];  Service: Neurosurgery    FL LAPS CLSR NTRSTM LG/SM INT W/RESCJ & ANASTOMOSIS N/A 2023    Procedure: LAPAROSCOPIC-CONVERTED TO OPEN CLOSURE COLOSTOMY, HEPATIC FLEXURE MOBILIZATION, COLORECTAL ANASTOMOSIS;  Surgeon: Odette Bonilla MD;  [facility];  Service: General    FL SIGMOIDOSCOPY FLX DX W/COLLJ SPEC BR/WA IF PFRMD N/A 2022    Procedure: Flexible sigmoidoscopy,  diagnostic colonoscopy;  Surgeon: Heriberto Shay DO;  [facility];  Service: General    RHIZOTOMY Left     TONSILLECTOMY AND ADENOIDECTOMY      TUBAL LIGATION       Social History   Social History     Substance and Sexual Activity   Alcohol Use Not Currently     Social History     Substance and Sexual Activity   Drug Use No     Social History     Tobacco Use   Smoking Status Former    Current packs/day: 0.00    Average packs/day: 0.5 packs/day for 5.0 years (2.5 ttl pk-yrs)    Types: Cigarettes    Start date: 1985    Quit date: 1990    Years since quittin.8   Smokeless Tobacco Never     Family History   Problem Relation Age of Onset    Breast cancer Mother     No Known Problems Daughter        Meds/Allergies       Current Outpatient Medications:     acetaminophen (TYLENOL) 325 mg tablet    atorvastatin (LIPITOR) 40 mg tablet    Banophen 25 MG capsule    Bioflavonoid Products (Vitamin C ER) 1000-100 MG TBCR    Cholecalciferol (Vitamin D3) 1000 units CAPS    clopidogrel (PLAVIX) 75 mg tablet    DULoxetine (CYMBALTA) 60 mg delayed release capsule    famotidine (PEPCID) 20 mg tablet    ferrous sulfate 325 (65 Fe) mg tablet    OXcarbazepine (TRILEPTAL) 600 mg tablet    oxybutynin (DITROPAN) 5 mg tablet    sodium chloride 1 g tablet    sodium chloride (OCEAN) 0.65 % nasal spray    Allergies   Allergen Reactions    Ace Inhibitors Angioedema     Hx of angioedema - unclear if caused by ACE inhibitors, but highly recommend avoiding.    Avonex [Interferon Beta-1a]  "Other (See Comments)     Per pt \"Med for MS passed out-fainted\"    Cephalosporins Swelling     DO NOT GIVE BETA LACTAMS.Patient with delayed angioedema, cefepime with potential cause.  Additionally other medications like Lovenox also considered. Then with second episode of angioedema, less intense than previous, again delayed response after again having a dose of Cefepime    Penicillins Rash     DO NOT GIVE BETA LACTAMS. Extensive total body rash occurring after Zosyn. Patient reported previous rash, mild, with Keflex.    Keppra [Levetiracetam] Itching and Other (See Comments)     syncope    Bactrim [Sulfamethoxazole-Trimethoprim] Itching     Other reaction(s): Itchy all over and rash.    Barium Sulfate Rash and Edema    Keppra [Levetiracetam] Rash and Edema    Vancomycin Hives     2/2023 admission patient developed delayed hypersensitivity with rash and then subsequent angioedema on 2/22/2023.  Higher suspicion for reaction to cephalosporins.  Could consider retrial of vancomycin in the future.       Objective     Blood pressure 122/78, temperature 98.9 °F (37.2 °C), temperature source Tympanic, height 5' 6\" (1.676 m), weight 73.9 kg (163 lb), last menstrual period 03/11/2011.    PHYSICAL EXAM:    General: Well-appearing, NAD  Eyes:  no conjunctival icterus or pallor  Abdominal: Soft, non-tender, non-distended  Neuro: alert and oriented  Psych: Normal affect    Lab Results:   Lab Results   Component Value Date     11/15/2017    K 4.0 06/20/2024    CO2 24 06/20/2024    CL 97 06/20/2024    BUN 8 06/20/2024    CREATININE 0.53 (L) 06/20/2024    GLUCOSE 132 02/15/2024     Lab Results   Component Value Date    WBC 8.42 02/17/2024    HGB 14.2 02/17/2024    HCT 41.8 02/17/2024    MCV 91 02/17/2024     02/17/2024     Lab Results   Component Value Date    TP 7.4 02/17/2024    AST 16 02/17/2024    ALT 11 02/17/2024    BILITOT 0.4 11/15/2017    BILIDIR 0.24 (H) 11/11/2022    INR 1.11 02/15/2024      Lab Results "   Component Value Date    IRON 27 (L) 05/11/2023    FERRITIN 51 02/14/2023     Lab Results   Component Value Date     05/18/2020    TRIG 61 11/10/2022       Radiology Results:   I have reviewed the results of any radiology studies performed within the last 90 days.     Lizzette Mcduffie MD  Gastroenterology Fellow  [facility]  Division of Gastroenterology and Hepatology

## 2024-10-30 NOTE — PATIENT INSTRUCTIONS
Scheduled date of colonoscopy (as of today): 12/12/24  Physician performing colonoscopy: Dr. Giordano  Location of colonoscopy: [facility]  Bowel prep reviewed with patient: 2 day Robert  Instructions reviewed with patient by: Pat  Clearances: N/A

## 2024-11-14 ENCOUNTER — VBI (OUTPATIENT)
Dept: ADMINISTRATIVE | Facility: OTHER | Age: 64
End: 2024-11-14

## 2024-11-14 NOTE — TELEPHONE ENCOUNTER
11/14/24 12:18 PM     Chart reviewed for Blood Pressure was/were submitted to the patient's insurance.     Tim Olivera MA   [facility]

## 2024-12-12 ENCOUNTER — ANESTHESIA EVENT (OUTPATIENT)
Dept: GASTROENTEROLOGY | Facility: HOSPITAL | Age: 64
End: 2024-12-12
Payer: COMMERCIAL

## 2024-12-12 ENCOUNTER — HOSPITAL ENCOUNTER (OUTPATIENT)
Dept: GASTROENTEROLOGY | Facility: HOSPITAL | Age: 64
Setting detail: OUTPATIENT SURGERY
End: 2024-12-12
Attending: INTERNAL MEDICINE
Payer: COMMERCIAL

## 2024-12-12 ENCOUNTER — ANESTHESIA (OUTPATIENT)
Dept: GASTROENTEROLOGY | Facility: HOSPITAL | Age: 64
End: 2024-12-12
Payer: COMMERCIAL

## 2024-12-12 VITALS
RESPIRATION RATE: 16 BRPM | HEART RATE: 72 BPM | OXYGEN SATURATION: 100 % | SYSTOLIC BLOOD PRESSURE: 189 MMHG | WEIGHT: 145 LBS | DIASTOLIC BLOOD PRESSURE: 85 MMHG | BODY MASS INDEX: 23.4 KG/M2 | TEMPERATURE: 97.7 F

## 2024-12-12 DIAGNOSIS — Z12.11 SCREENING FOR COLON CANCER: ICD-10-CM

## 2024-12-12 DIAGNOSIS — R19.4 CHANGE IN BOWEL HABITS: ICD-10-CM

## 2024-12-12 DIAGNOSIS — Z86.0100 HISTORY OF COLON POLYPS: ICD-10-CM

## 2024-12-12 PROCEDURE — 45378 DIAGNOSTIC COLONOSCOPY: CPT | Performed by: INTERNAL MEDICINE

## 2024-12-12 RX ORDER — SODIUM CHLORIDE 9 MG/ML
INJECTION, SOLUTION INTRAVENOUS CONTINUOUS PRN
Status: DISCONTINUED | OUTPATIENT
Start: 2024-12-12 | End: 2024-12-12

## 2024-12-12 RX ORDER — PROPOFOL 10 MG/ML
INJECTION, EMULSION INTRAVENOUS AS NEEDED
Status: DISCONTINUED | OUTPATIENT
Start: 2024-12-12 | End: 2024-12-12

## 2024-12-12 RX ADMIN — PROPOFOL 50 MG: 10 INJECTION, EMULSION INTRAVENOUS at 15:26

## 2024-12-12 RX ADMIN — PROPOFOL 50 MG: 10 INJECTION, EMULSION INTRAVENOUS at 15:41

## 2024-12-12 RX ADMIN — SODIUM CHLORIDE: 0.9 INJECTION, SOLUTION INTRAVENOUS at 15:19

## 2024-12-12 RX ADMIN — PROPOFOL 50 MG: 10 INJECTION, EMULSION INTRAVENOUS at 15:37

## 2024-12-12 RX ADMIN — PROPOFOL 50 MG: 10 INJECTION, EMULSION INTRAVENOUS at 15:32

## 2024-12-12 RX ADMIN — PROPOFOL 100 MG: 10 INJECTION, EMULSION INTRAVENOUS at 15:24

## 2024-12-12 NOTE — ANESTHESIA PREPROCEDURE EVALUATION
Procedure:  COLONOSCOPY    Relevant Problems   CARDIO   (+) Hyperlipidemia   (+) Venous stasis dermatitis of right lower extremity      GI/HEPATIC   (+) GERD       /RENAL   (+) CKD (chronic kidney disease) stage 1, GFR 90 ml/min or greater      HEMATOLOGY   (+) Chronic anemia   (+) Iron deficiency anemia      Diagnosed with MS at the age fo 26, states it has effect her eyes, right leg and hip. Last flare was >10yrs ago.     Physical Exam    Airway    Mallampati score: I  TM Distance: >3 FB  Neck ROM: full     Dental       Cardiovascular  Cardiovascular exam normal    Pulmonary  Pulmonary exam normal     Other Findings  post-pubertal.      Anesthesia Plan  ASA Score- 3     Anesthesia Type- IV sedation with anesthesia with ASA Monitors.         Additional Monitors:     Airway Plan:            Plan Factors-Exercise tolerance (METS): >4 METS.    Chart reviewed. EKG reviewed.  Existing labs reviewed. Patient summary reviewed.    Patient is not a current smoker.  Patient did not smoke on day of surgery.    Obstructive sleep apnea risk education given perioperatively.        Induction- intravenous.    Postoperative Plan-     Perioperative Resuscitation Plan - Level 1 - Full Code.       Informed Consent- Anesthetic plan and risks discussed with patient and spouse.  I personally reviewed this patient with the CRNA. Discussed and agreed on the Anesthesia Plan with the CRNA..

## 2024-12-12 NOTE — H&P
"History and Physical - [facility]  Rose Hammer 64 y.o. female MRN: [id number]                  HPI: Rose Hammer is a 64 y.o. year old female who presents for colonoscopy for CRC screening and changes in bowel habits (overflow diarrhea in setting of constipation). PMH includes ischemic colitis requiring L hemicolectomy.       REVIEW OF SYSTEMS: Per the HPI, and otherwise unremarkable.    Historical Information   Past Medical History:   Diagnosis Date    Anemia     Angioedema 2022    Balance problem     per pt related to her \"MS\"    Bladder infection     1/3/23 per pt no longer a problem\"    Braces as ambulation aid     right ankle/leg when walking    Cellulitis of right foot 2022    Cognitive impairment 2022    Some short-term impairment    Colostomy in place ([facility])     Essential hypertension 2020    Family history of breast cancer     per pt \"Mom  from it\"    Gait instability     GERD  2018    History of CVA (cerebrovascular accident) 2022    4mini strokes found on MRI.  Not hospitalized.  Early .    History of transfusion     Hyperlipidemia 2020    Hyponatremia     IBS (irritable bowel syndrome)     Ischemic colitis (HCC) 2022    Mini stroke     Multiple sclerosis (HCC)     Poor vision     Secondary to MS    Right foot drop     Risk for falls     SIADH (syndrome of inappropriate ADH production) ([facility]) 2015    Trigeminal neuralgia of right side of face 03/10/2014    Vitamin D deficiency     Walker as ambulation aid      Past Surgical History:   Procedure Laterality Date    ABDOMINAL ADHESION SURGERY N/A 2023    Procedure: EXTENSIVE LYSIS OF ADHESIONS;  Surgeon: Odette Bonilla MD;  [facility];  Service: General    ACHILLES TENDON SURGERY Right 2023    Procedure: TENOTOMY PERCUTANEOUS ACHILLES, Talonavicular joint capsule release, posterior tibial tendon lengthening, application of circular frame;  Surgeon: " James R Lachman, MD;  [facility];  Service: Orthopedics    ANKLE SURGERY      COLON SURGERY  11 09 2023    COLONOSCOPY N/A 8/9/2023    Procedure: INTRAOP FLEXIBLE COLONOSCOPY;  Surgeon: Odette Bonilla MD;  [facility];  Service: General    EXPLORATORY LAPAROTOMY W/ BOWEL RESECTION N/A 11/09/2022    Procedure: LAPAROTOMY EXPLORATORY W/ BOWEL RESECTION; APPLICATION ABTHERA VAC;  Surgeon: Heriberto Shay DO;  [facility];  Service: General    EXTERNAL FIXATOR (EX FIX) REMOVAL Right 02/02/2023    Procedure: REMOVAL EXTERNAL FIXATOR (EX FIX) Right lower extremity;  Surgeon: James R Lachman, MD;  [facility];  Service: Orthopedics    HIP SURGERY      jeri implanted right thigh and screw right hip-right knee screw    LAPAROTOMY N/A 11/10/2022    Procedure: LAPAROTOMY EXPLORATORY- SECOND LOOK, ABDOMINAL WOUND CLOSURE, CREATION OF A COLOSTOMY;  Surgeon: Mateo Sargent MD;  [facility];  Service: General    SD CREATE LESION STRTCTC PRQ NEUROLYTIC GASSERIAN Left 06/21/2016    Procedure:  V3 TRIGEMINAL RF RHIZOTOMY;  Surgeon: Bird Rosa MD;  [facility];  Service: Neurosurgery    SD CREATE LESION STRTCTC PRQ NEUROLYTIC GASSERIAN Left 01/15/2019    Procedure: RHIZOTOMY TRIGEMINAL NERVES (V2 & V3), LEFT;  Surgeon: Ac Pemberton MD;  [facility];  Service: Neurosurgery    SD CREATE LESION STRTCTC PRQ NEUROLYTIC GASSERIAN Right 04/02/2019    Procedure: RHIZOTOMY TRIGEMINAL NERVES (V2 & V3), RIGHT;  Surgeon: Ac Pemberton MD;  [facility];  Service: Neurosurgery    SD DSTRJ TRIGEMINAL NRV SUPRAORB INFRAORB BRANCH Right 03/11/2016    Procedure: RIGHT SIDED  TRIGEMINAL V2 RADIOFREQUENCY RHIZOTOMY;  Surgeon: Bird Rosa MD;  [facility];  Service: Neurosurgery    SD LAPS CLSR NTRSTM LG/SM INT W/RESCJ & ANASTOMOSIS N/A 8/9/2023    Procedure: LAPAROSCOPIC-CONVERTED TO OPEN CLOSURE COLOSTOMY, HEPATIC FLEXURE MOBILIZATION, COLORECTAL  "ANASTOMOSIS;  Surgeon: Odette Bonilla MD;  [facility];  Service: General    DE SIGMOIDOSCOPY FLX DX W/COLLJ SPEC BR/WA IF PFRMD N/A 2022    Procedure: Flexible sigmoidoscopy,  diagnostic colonoscopy;  Surgeon: Heriberto Shay DO;  [facility];  Service: General    RHIZOTOMY Left     TONSILLECTOMY AND ADENOIDECTOMY      TUBAL LIGATION       Social History   Social History     Substance and Sexual Activity   Alcohol Use Not Currently     Social History     Substance and Sexual Activity   Drug Use No     Social History     Tobacco Use   Smoking Status Former    Current packs/day: 0.00    Average packs/day: 0.5 packs/day for 5.0 years (2.5 ttl pk-yrs)    Types: Cigarettes    Start date: 1985    Quit date: 1990    Years since quittin.9   Smokeless Tobacco Never     Family History   Problem Relation Age of Onset    Breast cancer Mother     No Known Problems Daughter        Meds/Allergies       Current Outpatient Medications:     acetaminophen (TYLENOL) 325 mg tablet    atorvastatin (LIPITOR) 40 mg tablet    Banophen 25 MG capsule    Bioflavonoid Products (Vitamin C ER) 1000-100 MG TBCR    Cholecalciferol (Vitamin D3) 1000 units CAPS    clopidogrel (PLAVIX) 75 mg tablet    DULoxetine (CYMBALTA) 60 mg delayed release capsule    famotidine (PEPCID) 20 mg tablet    ferrous sulfate 325 (65 Fe) mg tablet    OXcarbazepine (TRILEPTAL) 600 mg tablet    oxybutynin (DITROPAN) 5 mg tablet    polyethylene glycol (Golytely) 4000 mL solution    sodium chloride (OCEAN) 0.65 % nasal spray    sodium chloride 1 g tablet    Allergies   Allergen Reactions    Ace Inhibitors Angioedema     Hx of angioedema - unclear if caused by ACE inhibitors, but highly recommend avoiding.    Avonex [Interferon Beta-1a] Other (See Comments)     Per pt \"Med for MS passed out-fainted\"    Cephalosporins Swelling     DO NOT GIVE BETA LACTAMS.Patient with delayed angioedema, cefepime with potential cause.  Additionally " other medications like Lovenox also considered. Then with second episode of angioedema, less intense than previous, again delayed response after again having a dose of Cefepime    Penicillins Rash     DO NOT GIVE BETA LACTAMS. Extensive total body rash occurring after Zosyn. Patient reported previous rash, mild, with Keflex.    Keppra [Levetiracetam] Itching and Other (See Comments)     syncope    Bactrim [Sulfamethoxazole-Trimethoprim] Itching     Other reaction(s): Itchy all over and rash.    Barium Sulfate Rash and Edema    Keppra [Levetiracetam] Rash and Edema    Vancomycin Hives     2/2023 admission patient developed delayed hypersensitivity with rash and then subsequent angioedema on 2/22/2023.  Higher suspicion for reaction to cephalosporins.  Could consider retrial of vancomycin in the future.       Objective     LMP 03/11/2011       PHYSICAL EXAM    Gen: NAD  Head: NCAT  CV: RRR  CHEST: Clear  ABD: soft, NT/ND  EXT: no edema      ASSESSMENT/PLAN:  This is a 64 y.o. year old female here for colonoscopy, and she is stable and optimized for her procedure.

## 2024-12-13 NOTE — ANESTHESIA POSTPROCEDURE EVALUATION
Post-Op Assessment Note    CV Status:  Stable  Pain Score: 0    Pain management: adequate       Mental Status:  Alert and awake   Hydration Status:  Euvolemic   PONV Controlled:  Controlled   Airway Patency:  Patent     Post Op Vitals Reviewed: Yes    No anethesia notable event occurred.    Staff: Anesthesiologist           Last Filed PACU Vitals:  Vitals Value Taken Time   Temp 97.7 °F (36.5 °C) 12/12/24 1547   Pulse 72 12/12/24 1603   /85 12/12/24 1603   Resp 16 12/12/24 1603   SpO2 100 % 12/12/24 1603       Modified Sukhdev:  Activity: 2 (12/12/2024  4:05 PM)  Respiration: 2 (12/12/2024  4:05 PM)  Circulation: 2 (12/12/2024  4:05 PM)  Consciousness: 2 (12/12/2024  4:05 PM)  Oxygen Saturation: 2 (12/12/2024  4:05 PM)  Modified Sukhdev Score: 10 (12/12/2024  4:05 PM)

## 2024-12-17 ENCOUNTER — OFFICE VISIT (OUTPATIENT)
Dept: FAMILY MEDICINE CLINIC | Facility: CLINIC | Age: 64
End: 2024-12-17
Payer: COMMERCIAL

## 2024-12-17 VITALS
SYSTOLIC BLOOD PRESSURE: 138 MMHG | HEART RATE: 70 BPM | RESPIRATION RATE: 16 BRPM | BODY MASS INDEX: 26.36 KG/M2 | DIASTOLIC BLOOD PRESSURE: 90 MMHG | HEIGHT: 66 IN | TEMPERATURE: 97.8 F | WEIGHT: 164 LBS | OXYGEN SATURATION: 97 %

## 2024-12-17 DIAGNOSIS — E78.5 HYPERLIPIDEMIA, UNSPECIFIED HYPERLIPIDEMIA TYPE: ICD-10-CM

## 2024-12-17 DIAGNOSIS — K21.9 GASTROESOPHAGEAL REFLUX DISEASE, UNSPECIFIED WHETHER ESOPHAGITIS PRESENT: Chronic | ICD-10-CM

## 2024-12-17 DIAGNOSIS — G50.0 TRIGEMINAL NEURALGIA: ICD-10-CM

## 2024-12-17 DIAGNOSIS — E22.2 SIADH (SYNDROME OF INAPPROPRIATE ADH PRODUCTION) (HCC): ICD-10-CM

## 2024-12-17 DIAGNOSIS — E87.1 CHRONIC HYPONATREMIA: ICD-10-CM

## 2024-12-17 DIAGNOSIS — G35 MULTIPLE SCLEROSIS (HCC): Primary | Chronic | ICD-10-CM

## 2024-12-17 DIAGNOSIS — Z23 NEEDS FLU SHOT: ICD-10-CM

## 2024-12-17 DIAGNOSIS — Z99.89 USES WALKER: ICD-10-CM

## 2024-12-17 PROCEDURE — 90673 RIV3 VACCINE NO PRESERV IM: CPT | Performed by: FAMILY MEDICINE

## 2024-12-17 PROCEDURE — 99214 OFFICE O/P EST MOD 30 MIN: CPT | Performed by: FAMILY MEDICINE

## 2024-12-17 PROCEDURE — 90471 IMMUNIZATION ADMIN: CPT | Performed by: FAMILY MEDICINE

## 2024-12-17 RX ORDER — DULOXETIN HYDROCHLORIDE 60 MG/1
60 CAPSULE, DELAYED RELEASE ORAL DAILY
Qty: 90 CAPSULE | Refills: 3 | Status: SHIPPED | OUTPATIENT
Start: 2024-12-17

## 2024-12-17 NOTE — PROGRESS NOTES
"Chief Complaint   Patient presents with    Follow-up     Patient is here for 3 mos f/u        HPI   Here for follow-up of hypertension, GERD, hyponatremia, hyperlipidemia, MS, SIADH, ambulatory dysfunction, and trigeminal neuralgia.  And constipation.  Recently seen by GI who recommended an obstruction series and colonoscopy.  And MiraLAX.  Colonoscopy was unremarkable except for last spell.  Here today with  who notes that she has not been this good for a while.  Continues on duloxetine which was originally started because of her trigeminal neuralgia.  Prefers to stay on it.    Past Medical History:   Diagnosis Date    Anemia     Angioedema 2022    Balance problem     per pt related to her \"MS\"    Bladder infection     1/3/23 per pt no longer a problem\"    Braces as ambulation aid     right ankle/leg when walking    Cellulitis of right foot 2022    Cognitive impairment 2022    Some short-term impairment    Colostomy in place ([facility])     Essential hypertension 2020    Family history of breast cancer     per pt \"Mom  from it\"    Gait instability     GERD  2018    History of CVA (cerebrovascular accident) 2022    4mini strokes found on MRI.  Not hospitalized.  Early .    History of transfusion     Hyperlipidemia 2020    Hyponatremia     IBS (irritable bowel syndrome)     Ischemic colitis ([facility]) 2022    Mini stroke     Multiple sclerosis ([facility])     Poor vision     Secondary to MS    Right foot drop     Risk for falls     SIADH (syndrome of inappropriate ADH production) ([facility]) 2015    Trigeminal neuralgia of right side of face 03/10/2014    Vitamin D deficiency     Walker as ambulation aid         Past Surgical History:   Procedure Laterality Date    ABDOMINAL ADHESION SURGERY N/A 2023    Procedure: EXTENSIVE LYSIS OF ADHESIONS;  Surgeon: Odette Bonilla MD;  [facility];  Service: General    ACHILLES TENDON SURGERY Right 2023    " Procedure: TENOTOMY PERCUTANEOUS ACHILLES, Talonavicular joint capsule release, posterior tibial tendon lengthening, application of circular frame;  Surgeon: James R Lachman, MD;  [facility];  Service: Orthopedics    ANKLE SURGERY      COLON SURGERY  11 09 2023    COLONOSCOPY N/A 8/9/2023    Procedure: INTRAOP FLEXIBLE COLONOSCOPY;  Surgeon: Odette Bonilla MD;  [facility];  Service: General    EXPLORATORY LAPAROTOMY W/ BOWEL RESECTION N/A 11/09/2022    Procedure: LAPAROTOMY EXPLORATORY W/ BOWEL RESECTION; APPLICATION ABTHERA VAC;  Surgeon: Heriberto Shay DO;  [facility];  Service: General    EXTERNAL FIXATOR (EX FIX) REMOVAL Right 02/02/2023    Procedure: REMOVAL EXTERNAL FIXATOR (EX FIX) Right lower extremity;  Surgeon: James R Lachman, MD;  [facility];  Service: Orthopedics    HIP SURGERY      jeri implanted right thigh and screw right hip-right knee screw    LAPAROTOMY N/A 11/10/2022    Procedure: LAPAROTOMY EXPLORATORY- SECOND LOOK, ABDOMINAL WOUND CLOSURE, CREATION OF A COLOSTOMY;  Surgeon: Mateo Sargent MD;  [facility];  Service: General    AK CREATE LESION STRTCTC PRQ NEUROLYTIC GASSERIAN Left 06/21/2016    Procedure:  V3 TRIGEMINAL RF RHIZOTOMY;  Surgeon: Bird Rosa MD;  [facility];  Service: Neurosurgery    AK CREATE LESION STRTCTC PRQ NEUROLYTIC GASSERIAN Left 01/15/2019    Procedure: RHIZOTOMY TRIGEMINAL NERVES (V2 & V3), LEFT;  Surgeon: Ac Pemberton MD;  [facility];  Service: Neurosurgery    AK CREATE LESION STRTCTC PRQ NEUROLYTIC GASSERIAN Right 04/02/2019    Procedure: RHIZOTOMY TRIGEMINAL NERVES (V2 & V3), RIGHT;  Surgeon: Ac Pemberton MD;  [facility];  Service: Neurosurgery    AK DSTRJ TRIGEMINAL NRV SUPRAORB INFRAORB BRANCH Right 03/11/2016    Procedure: RIGHT SIDED  TRIGEMINAL V2 RADIOFREQUENCY RHIZOTOMY;  Surgeon: Bird Rosa MD;  [facility];  Service: Neurosurgery    [facility]  "LG/SM INT W/RESCJ & ANASTOMOSIS N/A 2023    Procedure: LAPAROSCOPIC-CONVERTED TO OPEN CLOSURE COLOSTOMY, HEPATIC FLEXURE MOBILIZATION, COLORECTAL ANASTOMOSIS;  Surgeon: Odette Bonilla MD;  [facility];  Service: General    VT SIGMOIDOSCOPY FLX DX W/COLLJ SPEC BR/WA IF PFRMD N/A 2022    Procedure: Flexible sigmoidoscopy,  diagnostic colonoscopy;  Surgeon: Heriberto Shay DO;  [facility];  Service: General    RHIZOTOMY Left     TONSILLECTOMY AND ADENOIDECTOMY      TUBAL LIGATION         Social History     Tobacco Use    Smoking status: Former     Current packs/day: 0.00     Average packs/day: 0.5 packs/day for 5.0 years (2.5 ttl pk-yrs)     Types: Cigarettes     Start date: 1985     Quit date: 1990     Years since quittin.9    Smokeless tobacco: Never   Substance Use Topics    Alcohol use: Not Currently       Social History     Social History Narrative     With Milton since . .     32-year-old daughter, Angélica.    He works at [employer].    On disability for MS for a long time.    Got  26 after a 20 year romance.        The following portions of the patient's history were reviewed and updated as appropriate: allergies, current medications, past family history, past medical history, past social history, past surgical history, and problem list.      Review of Systems       /90   Pulse 70   Temp 97.8 °F (36.6 °C) (Temporal)   Resp 16   Ht 5' 6\" (1.676 m)   Wt 74.4 kg (164 lb)   LMP 2011   SpO2 97%   BMI 26.47 kg/m²      Physical Exam   Appears well.  Walking with walker.  Lungs are clear.  Heart regular.  Abdomen soft and nontender.  Brace on right foot.  Mood upbeat.  Affect appropriate.              Current Outpatient Medications:     acetaminophen (TYLENOL) 325 mg tablet, Take 2 tablets (650 mg total) by mouth every 6 (six) hours as needed for mild pain, Disp: , Rfl: 0    atorvastatin (LIPITOR) 40 mg tablet, TAKE 1 TABLET BY MOUTH " EVERY DAY IN THE EVENING, Disp: 90 tablet, Rfl: 3    Banophen 25 MG capsule, TAKE 1 TABLET (25 MG TOTAL) BY MOUTH EVERY 8 (EIGHT) HOURS AS NEEDED FOR ITCHING OR ALLERGIES, Disp: 100 capsule, Rfl: 5    Bioflavonoid Products (Vitamin C ER) 1000-100 MG TBCR, Take 1,000 mg by mouth 1 (one) time, Disp: , Rfl:     Cholecalciferol (Vitamin D3) 1000 units CAPS, Take 1,000 Units by mouth 1 (one) time, Disp: , Rfl:     clopidogrel (PLAVIX) 75 mg tablet, TAKE 1 TABLET BY MOUTH EVERY DAY, Disp: 90 tablet, Rfl: 1    DULoxetine (CYMBALTA) 60 mg delayed release capsule, Take 1 capsule (60 mg total) by mouth daily, Disp: 90 capsule, Rfl: 3    famotidine (PEPCID) 20 mg tablet, TAKE 1 TABLET BY MOUTH TWICE A DAY, Disp: 180 tablet, Rfl: 3    ferrous sulfate 325 (65 Fe) mg tablet, 1 tablet every other day, Disp: 45 tablet, Rfl: 0    OXcarbazepine (TRILEPTAL) 600 mg tablet, Take 1 tablet (600 mg total) by mouth every 8 (eight) hours, Disp: 270 tablet, Rfl: 3    oxybutynin (DITROPAN) 5 mg tablet, TAKE 1 TABLET BY MOUTH TWICE A DAY, Disp: 180 tablet, Rfl: 1    sodium chloride 1 g tablet, Take 2 tablets (2 g total) by mouth every 6 (six) hours, Disp: , Rfl:      No problem-specific Assessment & Plan notes found for this encounter.       Diagnoses and all orders for this visit:    Multiple sclerosis    Trigeminal neuralgia  -     DULoxetine (CYMBALTA) 60 mg delayed release capsule; Take 1 capsule (60 mg total) by mouth daily    Chronic hyponatremia    SIADH (syndrome of inappropriate ADH production) (HCC)    Hyperlipidemia, unspecified hyperlipidemia type    Gastroesophageal reflux disease, unspecified whether esophagitis present    Needs flu shot  -     influenza vaccine, recombinant, PF, 0.5 mL IM (Flublok)    Uses walker        Patient Instructions   Appears to be doing quite well.  Medications are reviewed and stay the same.  Flu shot.  Review of recent colonoscopy.  Recheck in 3 months.

## 2024-12-17 NOTE — PATIENT INSTRUCTIONS
Appears to be doing quite well.  Medications are reviewed and stay the same.  Flu shot.  Review of recent colonoscopy.  Recheck in 3 months.

## 2025-01-22 ENCOUNTER — TELEPHONE (OUTPATIENT)
Age: 65
End: 2025-01-22

## 2025-01-22 NOTE — TELEPHONE ENCOUNTER
Pt needs letter excusing them from jury duty due to their disability.    Please fax to [facility] court at  [phone] jury number [id number] and contact patient to advise once completed.

## 2025-02-05 DIAGNOSIS — R21 RASH IN ADULT: ICD-10-CM

## 2025-02-06 RX ORDER — DIPHENHYDRAMINE HYDROCHLORIDE 25 MG/1
CAPSULE ORAL
Qty: 100 CAPSULE | Refills: 5 | Status: SHIPPED | OUTPATIENT
Start: 2025-02-06

## 2025-03-21 ENCOUNTER — ANNUAL EXAM (OUTPATIENT)
Dept: OBGYN CLINIC | Facility: CLINIC | Age: 65
End: 2025-03-21
Payer: COMMERCIAL

## 2025-03-21 VITALS — WEIGHT: 150 LBS | BODY MASS INDEX: 24.21 KG/M2 | DIASTOLIC BLOOD PRESSURE: 110 MMHG | SYSTOLIC BLOOD PRESSURE: 160 MMHG

## 2025-03-21 DIAGNOSIS — Z12.31 ENCOUNTER FOR SCREENING MAMMOGRAM FOR MALIGNANT NEOPLASM OF BREAST: ICD-10-CM

## 2025-03-21 DIAGNOSIS — N95.2 ATROPHIC VAGINITIS: ICD-10-CM

## 2025-03-21 DIAGNOSIS — Z01.419 PAP SMEAR, AS PART OF ROUTINE GYNECOLOGICAL EXAMINATION: ICD-10-CM

## 2025-03-21 DIAGNOSIS — Z01.419 WOMEN'S ANNUAL ROUTINE GYNECOLOGICAL EXAMINATION: Primary | ICD-10-CM

## 2025-03-21 PROCEDURE — G0101 CA SCREEN;PELVIC/BREAST EXAM: HCPCS | Performed by: OBSTETRICS & GYNECOLOGY

## 2025-03-21 NOTE — PROGRESS NOTES
Assessment/Plan:    No problem-specific Assessment & Plan notes found for this encounter.       Diagnoses and all orders for this visit:    Women's annual routine gynecological examination  -     Thinprep Tis and HPV mRNA E6/E7    Pap smear, as part of routine gynecological examination    Encounter for screening mammogram for malignant neoplasm of breast    Atrophic vaginitis          Normal gynecological physical examination.  Self-breast examination stressed.  Mammogram ordered.  Discussed regular exercise, healthy diet, importance of vitamin D and calcium supplements.  Discussed importance of sun block use during periods of prolonged sun exposure.  Patient will be seen in 1 year for routine gynecologic and medical examination.  Patient will call office for any problems, concerns, or issues which may arise during the interim.     Subjective:     Patient is a 64-year-old female with past medical history significant for hypertension, hyperlipidemia, CKD, MS, and history of CVA who presents to the office for her yearly exam.  Patient has no concerns or complaints.  Patient denies abdominal or pelvic pain.  No abnormal bleeding or abnormal discharge.  No change in bowel or bladder function.  No hot flashes or night sweats.  Patient denied appetite change.    Patient denied breast tenderness or other breast abnormalities.  Patient is up-to-date with her mammogram.  Patient's mother passed from breast cancer when she was 62.    Patient denies chest pain or shortness of breath.  No headaches, weakness, or dizziness.    HPI    Patient ID: Rose Hammer is a 64 y.o. female who presents today for her annual gynecologic and medical examination    Menstrual status: Postmenopausal    Vasomotor symptoms: Denied    Patient reports normal appetite    Patient reports normal bowel and bladder habits    Patient denies any significant pelvic or abdominal pain    Patient denies any headaches, chest pain, shortness of breath fever  shakes or chills    Patient denies any COVID 19 symptoms including cough or loss of taste or smell    COVID vaccine status: Aware of vaccination protocol    Medical problems: Hypertension, hyperlipidemia, MS, CKD, history of CVA    Colonoscopy status: December 2024    Mammogram status: May 2024    The following portions of the patient's history were reviewed and updated as appropriate: allergies, current medications, past family history, past medical history, past social history, past surgical history and problem list.    Review of Systems   Constitutional: Negative.  Negative for appetite change, diaphoresis, fatigue, fever and unexpected weight change.   HENT: Negative.     Eyes: Negative.    Respiratory: Negative.     Cardiovascular: Negative.    Gastrointestinal: Negative.  Negative for abdominal pain, blood in stool, constipation, diarrhea, nausea and vomiting.   Endocrine: Negative.  Negative for cold intolerance and heat intolerance.   Genitourinary: Negative.  Negative for dysuria, frequency, hematuria, urgency, vaginal bleeding, vaginal discharge and vaginal pain.   Musculoskeletal: Negative.    Skin: Negative.    Allergic/Immunologic: Negative.    Neurological: Negative.    Hematological: Negative.  Negative for adenopathy.   Psychiatric/Behavioral: Negative.           Objective:      BP (!) 160/110   Wt 68 kg (150 lb) Comment: PATIENT  STATED WEIGHT  LMP 03/11/2011   BMI 24.21 kg/m²          Physical Exam  Constitutional:       General: She is not in acute distress.     Appearance: Normal appearance. She is well-developed. She is not diaphoretic.   HENT:      Head: Normocephalic and atraumatic.   Eyes:      Pupils: Pupils are equal, round, and reactive to light.   Cardiovascular:      Rate and Rhythm: Normal rate and regular rhythm.      Heart sounds: Normal heart sounds. No murmur heard.     No friction rub. No gallop.   Pulmonary:      Effort: Pulmonary effort is normal.      Breath sounds:  Normal breath sounds.   Chest:   Breasts:     Breasts are symmetrical.      Right: No inverted nipple, mass, nipple discharge, skin change or tenderness.      Left: No inverted nipple, mass, nipple discharge, skin change or tenderness.   Abdominal:      General: Bowel sounds are normal.      Palpations: Abdomen is soft.   Genitourinary:     General: Normal vulva.      Exam position: Supine.      Labia:         Right: No rash or lesion.         Left: No rash or lesion.       Vagina: Normal. No vaginal discharge, erythema, tenderness or bleeding.      Cervix: No discharge or friability.      Uterus: Not enlarged and not tender.       Adnexa:         Right: No mass, tenderness or fullness.          Left: No mass, tenderness or fullness.        Rectum: Normal. Guaiac result negative.      Comments: Atrophic vaginitis.  Good pelvic support confirmed  Musculoskeletal:         General: Normal range of motion.      Cervical back: Normal range of motion and neck supple.   Lymphadenopathy:      Cervical: No cervical adenopathy.      Upper Body:      Right upper body: No supraclavicular adenopathy.      Left upper body: No supraclavicular adenopathy.   Skin:     General: Skin is warm and dry.      Findings: No rash.   Neurological:      General: No focal deficit present.      Mental Status: She is alert and oriented to person, place, and time.   Psychiatric:         Mood and Affect: Mood normal.         Speech: Speech normal.         Behavior: Behavior normal.         Thought Content: Thought content normal.         Judgment: Judgment normal.

## 2025-03-25 ENCOUNTER — OFFICE VISIT (OUTPATIENT)
Dept: FAMILY MEDICINE CLINIC | Facility: CLINIC | Age: 65
End: 2025-03-25
Payer: COMMERCIAL

## 2025-03-25 VITALS
OXYGEN SATURATION: 98 % | BODY MASS INDEX: 24.11 KG/M2 | TEMPERATURE: 98 F | DIASTOLIC BLOOD PRESSURE: 82 MMHG | SYSTOLIC BLOOD PRESSURE: 134 MMHG | WEIGHT: 150 LBS | RESPIRATION RATE: 18 BRPM | HEART RATE: 74 BPM | HEIGHT: 66 IN

## 2025-03-25 DIAGNOSIS — R21 RASH IN ADULT: ICD-10-CM

## 2025-03-25 DIAGNOSIS — Z86.73 HISTORY OF CVA (CEREBROVASCULAR ACCIDENT): ICD-10-CM

## 2025-03-25 DIAGNOSIS — G35 MULTIPLE SCLEROSIS (HCC): ICD-10-CM

## 2025-03-25 DIAGNOSIS — G50.0 TRIGEMINAL NEURALGIA: ICD-10-CM

## 2025-03-25 DIAGNOSIS — E87.1 CHRONIC HYPONATREMIA: ICD-10-CM

## 2025-03-25 DIAGNOSIS — Z12.31 ENCOUNTER FOR SCREENING MAMMOGRAM FOR BREAST CANCER: Primary | ICD-10-CM

## 2025-03-25 DIAGNOSIS — K21.9 GASTROESOPHAGEAL REFLUX DISEASE, UNSPECIFIED WHETHER ESOPHAGITIS PRESENT: Chronic | ICD-10-CM

## 2025-03-25 DIAGNOSIS — E22.2 SIADH (SYNDROME OF INAPPROPRIATE ADH PRODUCTION) (HCC): ICD-10-CM

## 2025-03-25 DIAGNOSIS — E78.5 HYPERLIPIDEMIA, UNSPECIFIED HYPERLIPIDEMIA TYPE: ICD-10-CM

## 2025-03-25 DIAGNOSIS — D50.0 IRON DEFICIENCY ANEMIA DUE TO CHRONIC BLOOD LOSS: ICD-10-CM

## 2025-03-25 LAB
CLINICAL INFO: NORMAL
CYTO CVX: NORMAL
DATE PREVIOUS BX: NORMAL
HPV E6+E7 MRNA CVX QL NAA+PROBE: NOT DETECTED
LMP START DATE: NORMAL
SL AMB PREV. PAP:: NORMAL
SPECIMEN SOURCE CVX/VAG CYTO: NORMAL
STAT OF ADQ CVX/VAG CYTO-IMP: NORMAL

## 2025-03-25 PROCEDURE — 99214 OFFICE O/P EST MOD 30 MIN: CPT | Performed by: FAMILY MEDICINE

## 2025-03-25 PROCEDURE — G2211 COMPLEX E/M VISIT ADD ON: HCPCS | Performed by: FAMILY MEDICINE

## 2025-03-25 RX ORDER — FERROUS SULFATE 325(65) MG
TABLET ORAL
Qty: 45 TABLET | Refills: 3 | Status: SHIPPED | OUTPATIENT
Start: 2025-03-25

## 2025-03-25 RX ORDER — DIPHENHYDRAMINE HCL 25 MG
25 CAPSULE ORAL EVERY 8 HOURS PRN
Qty: 90 CAPSULE | Refills: 5 | Status: SHIPPED | OUTPATIENT
Start: 2025-03-25

## 2025-03-25 NOTE — PROGRESS NOTES
"Name: Rose Hammer      : 1960      MRN: [id number]  Encounter Provider: Chong López MD  Encounter Date: 3/25/2025   Encounter department: [facility]    Assessment & Plan  Trigeminal neuralgia         Multiple sclerosis (HCC)         Iron deficiency anemia due to chronic blood loss    Orders:  •  ferrous sulfate 325 (65 Fe) mg tablet; 1 tablet every other day    Rash in adult    Orders:  •  diphenhydrAMINE (Banophen) 25 mg capsule; Take 1 capsule (25 mg total) by mouth every 8 (eight) hours as needed for itching    History of CVA (cerebrovascular accident)         Chronic hyponatremia         SIADH (syndrome of inappropriate ADH production) (HCC)         Hyperlipidemia, unspecified hyperlipidemia type         Gastroesophageal reflux disease, unspecified whether esophagitis present         Encounter for screening mammogram for breast cancer    Orders:  •  Mammo screening bilateral w 3d and cad; Future       Patient Instructions   Doing great.  Medications are reviewed and stay the same.  Recheck in 3 months.      History of Present Illness     HPI  Here for follow-up of hypertension, GERD, hyponatremia, hyperlipidemia, MS, SIADH, ambulatory dysfunction, and trigeminal neuralgia.  And constipation.  Walking is okay.  No falls.  Mood okay.  Bowels working okay.  No pain from trigeminal nerve algia for which she is on duloxetine.      Review of Systems    Past Medical History:   Diagnosis Date   • Anemia    • Angioedema 2022   • Balance problem     per pt related to her \"MS\"   • Bladder infection     1/3/23 per pt no longer a problem\"   • Braces as ambulation aid     right ankle/leg when walking   • Cellulitis of right foot 2022   • Cognitive impairment 2022    Some short-term impairment   • Colostomy in place ([facility])    • Essential hypertension 2020   • Family history of breast cancer     per pt \"Mom  from it\"   • Gait instability    • GERD  2018   • " History of CVA (cerebrovascular accident) 11/17/2022    4mini strokes found on MRI.  Not hospitalized.  Early 2020.   • History of transfusion    • Hyperlipidemia 05/18/2020   • Hyponatremia    • IBS (irritable bowel syndrome)    • Ischemic colitis (HCC) 11/09/2022   • Mini stroke    • Multiple sclerosis (HCC)    • Poor vision     Secondary to MS   • Right foot drop    • Risk for falls    • SIADH (syndrome of inappropriate ADH production) ([facility]) 04/01/2015   • Trigeminal neuralgia of right side of face 03/10/2014   • Vitamin D deficiency    • Walker as ambulation aid      Past Surgical History:   Procedure Laterality Date   • ABDOMINAL ADHESION SURGERY N/A 8/9/2023    Procedure: EXTENSIVE LYSIS OF ADHESIONS;  Surgeon: Odette Bonilla MD;  Location: [facility];  Service: General   • ACHILLES TENDON SURGERY Right 01/09/2023    Procedure: TENOTOMY PERCUTANEOUS ACHILLES, Talonavicular joint capsule release, posterior tibial tendon lengthening, application of circular frame;  Surgeon: James R Lachman, MD;  Location: [facility];  Service: Orthopedics   • ANKLE SURGERY     • COLON SURGERY  11 09 2023   • COLONOSCOPY N/A 8/9/2023    Procedure: INTRAOP FLEXIBLE COLONOSCOPY;  Surgeon: Odette Bonilla MD;  Location[facility];  Service: General   • EXPLORATORY LAPAROTOMY W/ BOWEL RESECTION N/A 11/09/2022    Procedure: LAPAROTOMY EXPLORATORY W/ BOWEL RESECTION; APPLICATION ABTHERA VAC;  Surgeon: Heriberto Shay DO;  [facility];  Service: General   • EXTERNAL FIXATOR (EX FIX) REMOVAL Right 02/02/2023    Procedure: REMOVAL EXTERNAL FIXATOR (EX FIX) Right lower extremity;  Surgeon: James R Lachman, MD;  Location[facility];  Service: Orthopedics   • HIP SURGERY      jeri implanted right thigh and screw right hip-right knee screw   • LAPAROTOMY N/A 11/10/2022    Procedure: LAPAROTOMY EXPLORATORY- SECOND LOOK, ABDOMINAL WOUND CLOSURE, CREATION OF A COLOSTOMY;  Surgeon: Mateo Sargent MD;  [facility];   Service: General   • DC CREATE LESION STRTCTC PRQ NEUROLYTIC GASSERIAN Left 06/21/2016    Procedure:  V3 TRIGEMINAL RF RHIZOTOMY;  Surgeon: Bird Rosa MD;  Location: [facility];  Service: Neurosurgery   • DC CREATE LESION STRTCTC PRQ NEUROLYTIC GASSERIAN Left 01/15/2019    Procedure: RHIZOTOMY TRIGEMINAL NERVES (V2 & V3), LEFT;  Surgeon: Ac Pemberton MD;  [facility];  Service: Neurosurgery   • DC CREATE LESION STRTCTC PRQ NEUROLYTIC GASSERIAN Right 04/02/2019    Procedure: RHIZOTOMY TRIGEMINAL NERVES (V2 & V3), RIGHT;  Surgeon: Ac Pemberton MD;  [facility];  Service: Neurosurgery   • DC DSTRJ TRIGEMINAL NRV SUPRAORB INFRAORB BRANCH Right 03/11/2016    Procedure: RIGHT SIDED  TRIGEMINAL V2 RADIOFREQUENCY RHIZOTOMY;  Surgeon: Bird Rosa MD;  [facility];  Service: Neurosurgery   • DC LAPS CLSR NTRSTM LG/SM INT W/RESCJ & ANASTOMOSIS N/A 8/9/2023    Procedure: LAPAROSCOPIC-CONVERTED TO OPEN CLOSURE COLOSTOMY, HEPATIC FLEXURE MOBILIZATION, COLORECTAL ANASTOMOSIS;  Surgeon: Odette Bonilla MD;  [facility];  Service: General   • DC SIGMOIDOSCOPY FLX DX W/COLLJ SPEC BR/WA IF PFRMD N/A 11/09/2022    Procedure: Flexible sigmoidoscopy,  diagnostic colonoscopy;  Surgeon: Heriberto Shay DO;  [facility];  Service: General   • RHIZOTOMY Left    • TONSILLECTOMY AND ADENOIDECTOMY     • TUBAL LIGATION       Social History     Social History Narrative     With Milton since 2004. .     32-year-old daughter, Angélica.    He works at [employer].    On disability for MS for a long time.    Got  4/12/24 after a 20 year romance.     Current Outpatient Medications on File Prior to Visit   Medication Sig   • acetaminophen (TYLENOL) 325 mg tablet Take 2 tablets (650 mg total) by mouth every 6 (six) hours as needed for mild pain   • atorvastatin (LIPITOR) 40 mg tablet TAKE 1 TABLET BY MOUTH EVERY DAY IN THE EVENING   • Bioflavonoid Products (Vitamin C ER) 1000-100 MG  "TBCR Take 1,000 mg by mouth 1 (one) time   • Cholecalciferol (Vitamin D3) 1000 units CAPS Take 1,000 Units by mouth 1 (one) time   • clopidogrel (PLAVIX) 75 mg tablet TAKE 1 TABLET BY MOUTH EVERY DAY   • DULoxetine (CYMBALTA) 60 mg delayed release capsule Take 1 capsule (60 mg total) by mouth daily   • famotidine (PEPCID) 20 mg tablet TAKE 1 TABLET BY MOUTH TWICE A DAY   • OXcarbazepine (TRILEPTAL) 600 mg tablet Take 1 tablet (600 mg total) by mouth every 8 (eight) hours   • oxybutynin (DITROPAN) 5 mg tablet TAKE 1 TABLET BY MOUTH TWICE A DAY   • sodium chloride 1 g tablet Take 2 tablets (2 g total) by mouth every 6 (six) hours   • [DISCONTINUED] Banophen 25 MG capsule TAKE 1 TABLET (25 MG TOTAL) BY MOUTH EVERY 8 (EIGHT) HOURS AS NEEDED FOR ITCHING OR ALLERGIES   • [DISCONTINUED] ferrous sulfate 325 (65 Fe) mg tablet 1 tablet every other day     Allergies   Allergen Reactions   • Ace Inhibitors Angioedema     Hx of angioedema - unclear if caused by ACE inhibitors, but highly recommend avoiding.   • Avonex [Interferon Beta-1a] Other (See Comments)     Per pt \"Med for MS passed out-fainted\"   • Cephalosporins Swelling     DO NOT GIVE BETA LACTAMS.Patient with delayed angioedema, cefepime with potential cause.  Additionally other medications like Lovenox also considered. Then with second episode of angioedema, less intense than previous, again delayed response after again having a dose of Cefepime   • Penicillins Rash     DO NOT GIVE BETA LACTAMS. Extensive total body rash occurring after Zosyn. Patient reported previous rash, mild, with Keflex.   • Keppra [Levetiracetam] Itching and Other (See Comments)     syncope   • Bactrim [Sulfamethoxazole-Trimethoprim] Itching     Other reaction(s): Itchy all over and rash.   • Barium Sulfate Rash and Edema   • Keppra [Levetiracetam] Rash and Edema   • Vancomycin Hives     2/2023 admission patient developed delayed hypersensitivity with rash and then subsequent angioedema on " "2/22/2023.  Higher suspicion for reaction to cephalosporins.  Could consider retrial of vancomycin in the future.     Immunization History   Administered Date(s) Administered   • COVID-19 PFIZER VACCINE 0.3 ML IM 03/11/2021, 04/01/2021, 12/06/2021   • COVID-19 Pfizer Vac BIVALENT Parag-sucrose 12 Yr+ IM 03/21/2023   • COVID-19 Pfizer mRNA vacc PF parag-sucrose 12 yr and older (Comirnaty) 09/17/2024   • COVID-19 Pfizer vac (Parag-sucrose, gray cap) 12 yr+ IM 03/11/2021, 04/01/2021   • Influenza Recombinant Preservative Free Im 12/17/2024   • Influenza, injectable, quadrivalent, preservative free 0.5 mL 12/14/2023   • Tdap 08/16/2021   • Tuberculin Skin Test 03/15/2023   • Zoster Vaccine Recombinant 08/16/2021, 12/14/2023     Objective   /82 (BP Location: Left arm, Patient Position: Sitting, Cuff Size: Standard)   Pulse 74   Temp 98 °F (36.7 °C) (Temporal)   Resp 18   Ht 5' 6\" (1.676 m)   Wt 68 kg (150 lb)   LMP 03/11/2011   SpO2 98%   BMI 24.21 kg/m²     Physical Exam  Appears well.  Lungs are clear.  Heart regular.  Abdomen nontender.  Mood is upbeat.  Affect appropriate.    "

## 2025-04-24 DIAGNOSIS — Z86.73 HISTORY OF CVA (CEREBROVASCULAR ACCIDENT): ICD-10-CM

## 2025-04-24 DIAGNOSIS — N32.81 OVERACTIVE BLADDER: ICD-10-CM

## 2025-04-24 DIAGNOSIS — T78.3XXS ANGIOEDEMA, SEQUELA: ICD-10-CM

## 2025-04-25 RX ORDER — FAMOTIDINE 20 MG/1
20 TABLET, FILM COATED ORAL 2 TIMES DAILY
Qty: 180 TABLET | Refills: 1 | Status: SHIPPED | OUTPATIENT
Start: 2025-04-25

## 2025-04-25 RX ORDER — OXYBUTYNIN CHLORIDE 5 MG/1
5 TABLET ORAL 2 TIMES DAILY
Qty: 180 TABLET | Refills: 1 | Status: SHIPPED | OUTPATIENT
Start: 2025-04-25

## 2025-04-25 RX ORDER — CLOPIDOGREL BISULFATE 75 MG/1
75 TABLET ORAL DAILY
Qty: 90 TABLET | Refills: 3 | Status: SHIPPED | OUTPATIENT
Start: 2025-04-25

## 2025-04-25 RX ORDER — ATORVASTATIN CALCIUM 40 MG/1
40 TABLET, FILM COATED ORAL EVERY EVENING
Qty: 90 TABLET | Refills: 3 | Status: SHIPPED | OUTPATIENT
Start: 2025-04-25

## 2025-06-25 ENCOUNTER — OFFICE VISIT (OUTPATIENT)
Dept: FAMILY MEDICINE CLINIC | Facility: CLINIC | Age: 65
End: 2025-06-25
Payer: COMMERCIAL

## 2025-06-25 VITALS
HEART RATE: 78 BPM | HEIGHT: 66 IN | BODY MASS INDEX: 26.26 KG/M2 | SYSTOLIC BLOOD PRESSURE: 150 MMHG | RESPIRATION RATE: 18 BRPM | DIASTOLIC BLOOD PRESSURE: 100 MMHG | TEMPERATURE: 97.3 F | WEIGHT: 163.4 LBS | OXYGEN SATURATION: 97 %

## 2025-06-25 DIAGNOSIS — E87.1 CHRONIC HYPONATREMIA: ICD-10-CM

## 2025-06-25 DIAGNOSIS — G50.0 TRIGEMINAL NEURALGIA: ICD-10-CM

## 2025-06-25 DIAGNOSIS — K21.9 GASTROESOPHAGEAL REFLUX DISEASE, UNSPECIFIED WHETHER ESOPHAGITIS PRESENT: Chronic | ICD-10-CM

## 2025-06-25 DIAGNOSIS — R26.81 GAIT INSTABILITY: ICD-10-CM

## 2025-06-25 DIAGNOSIS — Z00.00 MEDICARE ANNUAL WELLNESS VISIT, SUBSEQUENT: Primary | ICD-10-CM

## 2025-06-25 DIAGNOSIS — Z86.73 HISTORY OF CVA (CEREBROVASCULAR ACCIDENT): ICD-10-CM

## 2025-06-25 DIAGNOSIS — E78.5 HYPERLIPIDEMIA, UNSPECIFIED HYPERLIPIDEMIA TYPE: ICD-10-CM

## 2025-06-25 DIAGNOSIS — E22.2 SIADH (SYNDROME OF INAPPROPRIATE ADH PRODUCTION) (HCC): ICD-10-CM

## 2025-06-25 DIAGNOSIS — G35 MULTIPLE SCLEROSIS (HCC): Chronic | ICD-10-CM

## 2025-06-25 PROCEDURE — G0439 PPPS, SUBSEQ VISIT: HCPCS | Performed by: FAMILY MEDICINE

## 2025-06-25 PROCEDURE — G2211 COMPLEX E/M VISIT ADD ON: HCPCS | Performed by: FAMILY MEDICINE

## 2025-06-25 PROCEDURE — 99214 OFFICE O/P EST MOD 30 MIN: CPT | Performed by: FAMILY MEDICINE

## 2025-06-25 NOTE — PATIENT INSTRUCTIONS
Doing great.  Medicare wellness exam was completed.  Medications are reviewed and remain the same.  Observation of blood pressure which is just 10 points too high today.  Recheck August for follow-up of blood pressure.    Medicare Preventive Visit Patient Instructions  Thank you for completing your Welcome to Medicare Visit or Medicare Annual Wellness Visit today. Your next wellness visit will be due in one year (6/26/2026).  The screening/preventive services that you may require over the next 5-10 years are detailed below. Some tests may not apply to you based off risk factors and/or age. Screening tests ordered at today's visit but not completed yet may show as past due. Also, please note that scanned in results may not display below.  Preventive Screenings:  Service Recommendations Previous Testing/Comments   Colorectal Cancer Screening  * Colonoscopy    * Fecal Occult Blood Test (FOBT)/Fecal Immunochemical Test (FIT)  * Fecal DNA/Cologuard Test  * Flexible Sigmoidoscopy Age: 45-75 years old   Colonoscopy: every 10 years (may be performed more frequently if at higher risk)  OR  FOBT/FIT: every 1 year  OR  Cologuard: every 3 years  OR  Sigmoidoscopy: every 5 years  Screening may be recommended earlier than age 45 if at higher risk for colorectal cancer. Also, an individualized decision between you and your healthcare provider will decide whether screening between the ages of 76-85 would be appropriate. Colonoscopy: 12/12/2024  FOBT/FIT: Not on file  Cologuard: Not on file  Sigmoidoscopy: 11/09/2022    Screening Current     Breast Cancer Screening Age: 40+ years old  Frequency: every 1-2 years  Not required if history of left and right mastectomy Mammogram: 06/06/2025    Screening Current   Cervical Cancer Screening Between the ages of 21-29, pap smear recommended once every 3 years.   Between the ages of 30-65, can perform pap smear with HPV co-testing every 5 years.   Recommendations may differ for women with a  history of total hysterectomy, cervical cancer, or abnormal pap smears in past. Pap Smear: 03/21/2025    Screening Current   Hepatitis C Screening Once for adults born between 1945 and 1965  More frequently in patients at high risk for Hepatitis C Hep C Antibody: Not on file        Diabetes Screening 1-2 times per year if you're at risk for diabetes or have pre-diabetes Fasting glucose: 102 mg/dL (4/11/2024)  A1C: No results in last 5 years (No results in last 5 years)  Screening Current   Cholesterol Screening Once every 5 years if you don't have a lipid disorder. May order more often based on risk factors. Lipid panel: Not on file    Screening Not Indicated  History Lipid Disorder     Other Preventive Screenings Covered by Medicare:  Abdominal Aortic Aneurysm (AAA) Screening: covered once if your at risk. You're considered to be at risk if you have a family history of AAA.  Lung Cancer Screening: covers low dose CT scan once per year if you meet all of the following conditions: (1) Age 55-77; (2) No signs or symptoms of lung cancer; (3) Current smoker or have quit smoking within the last 15 years; (4) You have a tobacco smoking history of at least 20 pack years (packs per day multiplied by number of years you smoked); (5) You get a written order from a healthcare provider.  Glaucoma Screening: covered annually if you're considered high risk: (1) You have diabetes OR (2) Family history of glaucoma OR (3)  aged 50 and older OR (4)  American aged 65 and older  Osteoporosis Screening: covered every 2 years if you meet one of the following conditions: (1) You're estrogen deficient and at risk for osteoporosis based off medical history and other findings; (2) Have a vertebral abnormality; (3) On glucocorticoid therapy for more than 3 months; (4) Have primary hyperparathyroidism; (5) On osteoporosis medications and need to assess response to drug therapy.   Last bone density test (DXA Scan): Not  on file.  HIV Screening: covered annually if you're between the age of 15-65. Also covered annually if you are younger than 15 and older than 65 with risk factors for HIV infection. For pregnant patients, it is covered up to 3 times per pregnancy.    Immunizations:  Immunization Recommendations   Influenza Vaccine Annual influenza vaccination during flu season is recommended for all persons aged >= 6 months who do not have contraindications   Pneumococcal Vaccine   * Pneumococcal conjugate vaccine = PCV13 (Prevnar 13), PCV15 (Vaxneuvance), PCV20 (Prevnar 20)  * Pneumococcal polysaccharide vaccine = PPSV23 (Pneumovax) Adults 19-63 yo with certain risk factors or if 65+ yo  If never received any pneumonia vaccine: recommend Prevnar 20 (PCV20)  Give PCV20 if previously received 1 dose of PCV13 or PPSV23   Hepatitis B Vaccine 3 dose series if at intermediate or high risk (ex: diabetes, end stage renal disease, liver disease)   Respiratory syncytial virus (RSV) Vaccine - COVERED BY MEDICARE PART D  * RSVPreF3 (Arexvy) CDC recommends that adults 60 years of age and older may receive a single dose of RSV vaccine using shared clinical decision-making (SCDM)   Tetanus (Td) Vaccine - COST NOT COVERED BY MEDICARE PART B Following completion of primary series, a booster dose should be given every 10 years to maintain immunity against tetanus. Td may also be given as tetanus wound prophylaxis.   Tdap Vaccine - COST NOT COVERED BY MEDICARE PART B Recommended at least once for all adults. For pregnant patients, recommended with each pregnancy.   Shingles Vaccine (Shingrix) - COST NOT COVERED BY MEDICARE PART B  2 shot series recommended in those 19 years and older who have or will have weakened immune systems or those 50 years and older     Health Maintenance Due:      Topic Date Due    Hepatitis C Screening  Never done    HIV Screening  Never done    Breast Cancer Screening: Mammogram  06/06/2026    Colorectal Cancer Screening   12/11/2029    Cervical Cancer Screening  03/21/2030     Immunizations Due:      Topic Date Due    Pneumococcal Vaccine: 50+ Years (1 of 1 - PCV) Never done     Advance Directives   What are advance directives?  Advance directives are legal documents that state your wishes and plans for medical care. These plans are made ahead of time in case you lose your ability to make decisions for yourself. Advance directives can apply to any medical decision, such as the treatments you want, and if you want to donate organs.   What are the types of advance directives?  There are many types of advance directives, and each state has rules about how to use them. You may choose a combination of any of the following:  Living will:  This is a written record of the treatment you want. You can also choose which treatments you do not want, which to limit, and which to stop at a certain time. This includes surgery, medicine, IV fluid, and tube feedings.   Durable power of  for healthcare (DPAHC):  This is a written record that states who you want to make healthcare choices for you when you are unable to make them for yourself. This person, called a proxy, is usually a family member or a friend. You may choose more than 1 proxy.  Do not resuscitate (DNR) order:  A DNR order is used in case your heart stops beating or you stop breathing. It is a request not to have certain forms of treatment, such as CPR. A DNR order may be included in other types of advance directives.  Medical directive:  This covers the care that you want if you are in a coma, near death, or unable to make decisions for yourself. You can list the treatments you want for each condition. Treatment may include pain medicine, surgery, blood transfusions, dialysis, IV or tube feedings, and a ventilator (breathing machine).  Values history:  This document has questions about your views, beliefs, and how you feel and think about life. This information can help others  choose the care that you would choose.  Why are advance directives important?  An advance directive helps you control your care. Although spoken wishes may be used, it is better to have your wishes written down. Spoken wishes can be misunderstood, or not followed. Treatments may be given even if you do not want them. An advance directive may make it easier for your family to make difficult choices about your care.   Urinary Incontinence   Urinary incontinence (UI)  is when you lose control of your bladder. UI develops because your bladder cannot store or empty urine properly. The 3 most common types of UI are stress incontinence, urge incontinence, or both.  Medicines:   May be given to help strengthen your bladder control. Report any side effects of medication to your healthcare provider.  Do pelvic muscle exercises often:  Your pelvic muscles help you stop urinating. Squeeze these muscles tight for 5 seconds, then relax for 5 seconds. Gradually work up to squeezing for 10 seconds. Do 3 sets of 15 repetitions a day, or as directed. This will help strengthen your pelvic muscles and improve bladder control.  Train your bladder:  Go to the bathroom at set times, such as every 2 hours, even if you do not feel the urge to go. You can also try to hold your urine when you feel the urge to go. For example, hold your urine for 5 minutes when you feel the urge to go. As that becomes easier, hold your urine for 10 minutes.   Self-care:   Keep a UI record.  Write down how often you leak urine and how much you leak. Make a note of what you were doing when you leaked urine.  Drink liquids as directed. You may need to limit the amount of liquid you drink to help control your urine leakage. Do not drink any liquid right before you go to bed. Limit or do not have drinks that contain caffeine or alcohol.   Prevent constipation.  Eat a variety of high-fiber foods. Good examples are high-fiber cereals, beans, vegetables, and  whole-grain breads. Walking is the best way to trigger your intestines to have a bowel movement.  Exercise regularly and maintain a healthy weight.  Weight loss and exercise will decrease pressure on your bladder and help you control your leakage.   Use a catheter as directed  to help empty your bladder. A catheter is a tiny, plastic tube that is put into your bladder to drain your urine.   Go to behavior therapy as directed.  Behavior therapy may be used to help you learn to control your urge to urinate.    Weight Management   Why it is important to manage your weight:  Being overweight increases your risk of health conditions such as heart disease, high blood pressure, type 2 diabetes, and certain types of cancer. It can also increase your risk for osteoarthritis, sleep apnea, and other respiratory problems. Aim for a slow, steady weight loss. Even a small amount of weight loss can lower your risk of health problems.  How to lose weight safely:  A safe and healthy way to lose weight is to eat fewer calories and get regular exercise. You can lose up about 1 pound a week by decreasing the number of calories you eat by 500 calories each day.   Healthy meal plan for weight management:  A healthy meal plan includes a variety of foods, contains fewer calories, and helps you stay healthy. A healthy meal plan includes the following:  Eat whole-grain foods more often.  A healthy meal plan should contain fiber. Fiber is the part of grains, fruits, and vegetables that is not broken down by your body. Whole-grain foods are healthy and provide extra fiber in your diet. Some examples of whole-grain foods are whole-wheat breads and pastas, oatmeal, brown rice, and bulgur.  Eat a variety of vegetables every day.  Include dark, leafy greens such as spinach, kale, rain greens, and mustard greens. Eat yellow and orange vegetables such as carrots, sweet potatoes, and winter squash.   Eat a variety of fruits every day.  Choose  fresh or canned fruit (canned in its own juice or light syrup) instead of juice. Fruit juice has very little or no fiber.  Eat low-fat dairy foods.  Drink fat-free (skim) milk or 1% milk. Eat fat-free yogurt and low-fat cottage cheese. Try low-fat cheeses such as mozzarella and other reduced-fat cheeses.  Choose meat and other protein foods that are low in fat.  Choose beans or other legumes such as split peas or lentils. Choose fish, skinless poultry (chicken or turkey), or lean cuts of red meat (beef or pork). Before you cook meat or poultry, cut off any visible fat.   Use less fat and oil.  Try baking foods instead of frying them. Add less fat, such as margarine, sour cream, regular salad dressing and mayonnaise to foods. Eat fewer high-fat foods. Some examples of high-fat foods include french fries, doughnuts, ice cream, and cakes.  Eat fewer sweets.  Limit foods and drinks that are high in sugar. This includes candy, cookies, regular soda, and sweetened drinks.  Exercise:  Exercise at least 30 minutes per day on most days of the week. Some examples of exercise include walking, biking, dancing, and swimming. You can also fit in more physical activity by taking the stairs instead of the elevator or parking farther away from stores. Ask your healthcare provider about the best exercise plan for you.    © Copyright [employer] 2018 Information is for End User's use only and may not be sold, redistributed or otherwise used for commercial purposes. All illustrations and images included in CareNotes® are the copyrighted property of [employer] or [employer]

## 2025-06-25 NOTE — PROGRESS NOTES
Name: Rose Hammer      : 1960      MRN: [id number]  Encounter Provider: Chong López MD  Encounter Date: 2025   Encounter department: [facility]    Assessment & Plan  Medicare annual wellness visit, subsequent         Multiple sclerosis         History of CVA (cerebrovascular accident)         Trigeminal neuralgia         Chronic hyponatremia         SIADH (syndrome of inappropriate ADH production) (HCC)         Gastroesophageal reflux disease, unspecified whether esophagitis present         Gait instability         Hyperlipidemia, unspecified hyperlipidemia type            Patient Instructions   Doing great.  Medicare wellness exam was completed.  Medications are reviewed and remain the same.  Observation of blood pressure which is just 10 points too high today.  Recheck August for follow-up of blood pressure.    Medicare Preventive Visit Patient Instructions  Thank you for completing your Welcome to Medicare Visit or Medicare Annual Wellness Visit today. Your next wellness visit will be due in one year (2026).  The screening/preventive services that you may require over the next 5-10 years are detailed below. Some tests may not apply to you based off risk factors and/or age. Screening tests ordered at today's visit but not completed yet may show as past due. Also, please note that scanned in results may not display below.  Preventive Screenings:  Service Recommendations Previous Testing/Comments   Colorectal Cancer Screening  * Colonoscopy    * Fecal Occult Blood Test (FOBT)/Fecal Immunochemical Test (FIT)  * Fecal DNA/Cologuard Test  * Flexible Sigmoidoscopy Age: 45-75 years old   Colonoscopy: every 10 years (may be performed more frequently if at higher risk)  OR  FOBT/FIT: every 1 year  OR  Cologuard: every 3 years  OR  Sigmoidoscopy: every 5 years  Screening may be recommended earlier than age 45 if at higher risk for colorectal cancer. Also, an individualized  decision between you and your healthcare provider will decide whether screening between the ages of 76-85 would be appropriate. Colonoscopy: 12/12/2024  FOBT/FIT: Not on file  Cologuard: Not on file  Sigmoidoscopy: 11/09/2022    Screening Current     Breast Cancer Screening Age: 40+ years old  Frequency: every 1-2 years  Not required if history of left and right mastectomy Mammogram: 06/06/2025    Screening Current   Cervical Cancer Screening Between the ages of 21-29, pap smear recommended once every 3 years.   Between the ages of 30-65, can perform pap smear with HPV co-testing every 5 years.   Recommendations may differ for women with a history of total hysterectomy, cervical cancer, or abnormal pap smears in past. Pap Smear: 03/21/2025    Screening Current   Hepatitis C Screening Once for adults born between 1945 and 1965  More frequently in patients at high risk for Hepatitis C Hep C Antibody: Not on file        Diabetes Screening 1-2 times per year if you're at risk for diabetes or have pre-diabetes Fasting glucose: 102 mg/dL (4/11/2024)  A1C: No results in last 5 years (No results in last 5 years)  Screening Current   Cholesterol Screening Once every 5 years if you don't have a lipid disorder. May order more often based on risk factors. Lipid panel: Not on file    Screening Not Indicated  History Lipid Disorder     Other Preventive Screenings Covered by Medicare:  Abdominal Aortic Aneurysm (AAA) Screening: covered once if your at risk. You're considered to be at risk if you have a family history of AAA.  Lung Cancer Screening: covers low dose CT scan once per year if you meet all of the following conditions: (1) Age 55-77; (2) No signs or symptoms of lung cancer; (3) Current smoker or have quit smoking within the last 15 years; (4) You have a tobacco smoking history of at least 20 pack years (packs per day multiplied by number of years you smoked); (5) You get a written order from a healthcare  provider.  Glaucoma Screening: covered annually if you're considered high risk: (1) You have diabetes OR (2) Family history of glaucoma OR (3)  aged 50 and older OR (4)  American aged 65 and older  Osteoporosis Screening: covered every 2 years if you meet one of the following conditions: (1) You're estrogen deficient and at risk for osteoporosis based off medical history and other findings; (2) Have a vertebral abnormality; (3) On glucocorticoid therapy for more than 3 months; (4) Have primary hyperparathyroidism; (5) On osteoporosis medications and need to assess response to drug therapy.   Last bone density test (DXA Scan): Not on file.  HIV Screening: covered annually if you're between the age of 15-65. Also covered annually if you are younger than 15 and older than 65 with risk factors for HIV infection. For pregnant patients, it is covered up to 3 times per pregnancy.    Immunizations:  Immunization Recommendations   Influenza Vaccine Annual influenza vaccination during flu season is recommended for all persons aged >= 6 months who do not have contraindications   Pneumococcal Vaccine   * Pneumococcal conjugate vaccine = PCV13 (Prevnar 13), PCV15 (Vaxneuvance), PCV20 (Prevnar 20)  * Pneumococcal polysaccharide vaccine = PPSV23 (Pneumovax) Adults 19-63 yo with certain risk factors or if 65+ yo  If never received any pneumonia vaccine: recommend Prevnar 20 (PCV20)  Give PCV20 if previously received 1 dose of PCV13 or PPSV23   Hepatitis B Vaccine 3 dose series if at intermediate or high risk (ex: diabetes, end stage renal disease, liver disease)   Respiratory syncytial virus (RSV) Vaccine - COVERED BY MEDICARE PART D  * RSVPreF3 (Arexvy) CDC recommends that adults 60 years of age and older may receive a single dose of RSV vaccine using shared clinical decision-making (SCDM)   Tetanus (Td) Vaccine - COST NOT COVERED BY MEDICARE PART B Following completion of primary series, a booster dose  should be given every 10 years to maintain immunity against tetanus. Td may also be given as tetanus wound prophylaxis.   Tdap Vaccine - COST NOT COVERED BY MEDICARE PART B Recommended at least once for all adults. For pregnant patients, recommended with each pregnancy.   Shingles Vaccine (Shingrix) - COST NOT COVERED BY MEDICARE PART B  2 shot series recommended in those 19 years and older who have or will have weakened immune systems or those 50 years and older     Health Maintenance Due:      Topic Date Due   • Hepatitis C Screening  Never done   • HIV Screening  Never done   • Breast Cancer Screening: Mammogram  06/06/2026   • Colorectal Cancer Screening  12/11/2029   • Cervical Cancer Screening  03/21/2030     Immunizations Due:      Topic Date Due   • Pneumococcal Vaccine: 50+ Years (1 of 1 - PCV) Never done     Advance Directives   What are advance directives?  Advance directives are legal documents that state your wishes and plans for medical care. These plans are made ahead of time in case you lose your ability to make decisions for yourself. Advance directives can apply to any medical decision, such as the treatments you want, and if you want to donate organs.   What are the types of advance directives?  There are many types of advance directives, and each state has rules about how to use them. You may choose a combination of any of the following:  Living will:  This is a written record of the treatment you want. You can also choose which treatments you do not want, which to limit, and which to stop at a certain time. This includes surgery, medicine, IV fluid, and tube feedings.   Durable power of  for healthcare (DPAHC):  This is a written record that states who you want to make healthcare choices for you when you are unable to make them for yourself. This person, called a proxy, is usually a family member or a friend. You may choose more than 1 proxy.  Do not resuscitate (DNR) order:  A DNR order  is used in case your heart stops beating or you stop breathing. It is a request not to have certain forms of treatment, such as CPR. A DNR order may be included in other types of advance directives.  Medical directive:  This covers the care that you want if you are in a coma, near death, or unable to make decisions for yourself. You can list the treatments you want for each condition. Treatment may include pain medicine, surgery, blood transfusions, dialysis, IV or tube feedings, and a ventilator (breathing machine).  Values history:  This document has questions about your views, beliefs, and how you feel and think about life. This information can help others choose the care that you would choose.  Why are advance directives important?  An advance directive helps you control your care. Although spoken wishes may be used, it is better to have your wishes written down. Spoken wishes can be misunderstood, or not followed. Treatments may be given even if you do not want them. An advance directive may make it easier for your family to make difficult choices about your care.   Urinary Incontinence   Urinary incontinence (UI)  is when you lose control of your bladder. UI develops because your bladder cannot store or empty urine properly. The 3 most common types of UI are stress incontinence, urge incontinence, or both.  Medicines:   May be given to help strengthen your bladder control. Report any side effects of medication to your healthcare provider.  Do pelvic muscle exercises often:  Your pelvic muscles help you stop urinating. Squeeze these muscles tight for 5 seconds, then relax for 5 seconds. Gradually work up to squeezing for 10 seconds. Do 3 sets of 15 repetitions a day, or as directed. This will help strengthen your pelvic muscles and improve bladder control.  Train your bladder:  Go to the bathroom at set times, such as every 2 hours, even if you do not feel the urge to go. You can also try to hold your urine  when you feel the urge to go. For example, hold your urine for 5 minutes when you feel the urge to go. As that becomes easier, hold your urine for 10 minutes.   Self-care:   Keep a UI record.  Write down how often you leak urine and how much you leak. Make a note of what you were doing when you leaked urine.  Drink liquids as directed. You may need to limit the amount of liquid you drink to help control your urine leakage. Do not drink any liquid right before you go to bed. Limit or do not have drinks that contain caffeine or alcohol.   Prevent constipation.  Eat a variety of high-fiber foods. Good examples are high-fiber cereals, beans, vegetables, and whole-grain breads. Walking is the best way to trigger your intestines to have a bowel movement.  Exercise regularly and maintain a healthy weight.  Weight loss and exercise will decrease pressure on your bladder and help you control your leakage.   Use a catheter as directed  to help empty your bladder. A catheter is a tiny, plastic tube that is put into your bladder to drain your urine.   Go to behavior therapy as directed.  Behavior therapy may be used to help you learn to control your urge to urinate.    Weight Management   Why it is important to manage your weight:  Being overweight increases your risk of health conditions such as heart disease, high blood pressure, type 2 diabetes, and certain types of cancer. It can also increase your risk for osteoarthritis, sleep apnea, and other respiratory problems. Aim for a slow, steady weight loss. Even a small amount of weight loss can lower your risk of health problems.  How to lose weight safely:  A safe and healthy way to lose weight is to eat fewer calories and get regular exercise. You can lose up about 1 pound a week by decreasing the number of calories you eat by 500 calories each day.   Healthy meal plan for weight management:  A healthy meal plan includes a variety of foods, contains fewer calories, and helps  you stay healthy. A healthy meal plan includes the following:  Eat whole-grain foods more often.  A healthy meal plan should contain fiber. Fiber is the part of grains, fruits, and vegetables that is not broken down by your body. Whole-grain foods are healthy and provide extra fiber in your diet. Some examples of whole-grain foods are whole-wheat breads and pastas, oatmeal, brown rice, and bulgur.  Eat a variety of vegetables every day.  Include dark, leafy greens such as spinach, kale, rain greens, and mustard greens. Eat yellow and orange vegetables such as carrots, sweet potatoes, and winter squash.   Eat a variety of fruits every day.  Choose fresh or canned fruit (canned in its own juice or light syrup) instead of juice. Fruit juice has very little or no fiber.  Eat low-fat dairy foods.  Drink fat-free (skim) milk or 1% milk. Eat fat-free yogurt and low-fat cottage cheese. Try low-fat cheeses such as mozzarella and other reduced-fat cheeses.  Choose meat and other protein foods that are low in fat.  Choose beans or other legumes such as split peas or lentils. Choose fish, skinless poultry (chicken or turkey), or lean cuts of red meat (beef or pork). Before you cook meat or poultry, cut off any visible fat.   Use less fat and oil.  Try baking foods instead of frying them. Add less fat, such as margarine, sour cream, regular salad dressing and mayonnaise to foods. Eat fewer high-fat foods. Some examples of high-fat foods include french fries, doughnuts, ice cream, and cakes.  Eat fewer sweets.  Limit foods and drinks that are high in sugar. This includes candy, cookies, regular soda, and sweetened drinks.  Exercise:  Exercise at least 30 minutes per day on most days of the week. Some examples of exercise include walking, biking, dancing, and swimming. You can also fit in more physical activity by taking the stairs instead of the elevator or parking farther away from stores. Ask your healthcare provider about  the best exercise plan for you.    © Copyright [employer] 2018 Information is for End User's use only and may not be sold, redistributed or otherwise used for commercial purposes. All illustrations and images included in CareNotes® are the copyrighted property of [email or web address] or [employer]          History of Present Illness     HPI  Here for follow-up of hypertension, GERD, hyponatremia, hyperlipidemia, MS, SIADH, ambulatory dysfunction, and trigeminal neuralgia.  And Medicare wellness exam.    Bowels are more runny than solid but stable.  Surgeon recommended MiraLAX.  Walking is okay.  No falls.        Review of Systems    Past Medical History[1]  Past Surgical History:   Procedure Laterality Date   • ABDOMINAL ADHESION SURGERY N/A 8/9/2023    Procedure: EXTENSIVE LYSIS OF ADHESIONS;  Surgeon: Odette Bonilla MD;  [facility];  Service: General   • ACHILLES TENDON SURGERY Right 01/09/2023    Procedure: TENOTOMY PERCUTANEOUS ACHILLES, Talonavicular joint capsule release, posterior tibial tendon lengthening, application of circular frame;  Surgeon: James R Lachman, MD;  Location: [facility];  Service: Orthopedics   • ANKLE SURGERY     • COLON SURGERY  11 09 2023   • COLONOSCOPY N/A 8/9/2023    Procedure: INTRAOP FLEXIBLE COLONOSCOPY;  Surgeon: Odette Bonilla MD;  [facility];  Service: General   • EXPLORATORY LAPAROTOMY W/ BOWEL RESECTION N/A 11/09/2022    Procedure: LAPAROTOMY EXPLORATORY W/ BOWEL RESECTION; APPLICATION ABTHERA VAC;  Surgeon: Heriberto Shay DO;  [facility];  Service: General   • EXTERNAL FIXATOR (EX FIX) REMOVAL Right 02/02/2023    Procedure: REMOVAL EXTERNAL FIXATOR (EX FIX) Right lower extremity;  Surgeon: James R Lachman, MD;  [facility];  Service: Orthopedics   • HIP SURGERY      jeri implanted right thigh and screw right hip-right knee screw   • LAPAROTOMY N/A 11/10/2022    Procedure: LAPAROTOMY EXPLORATORY- SECOND LOOK,  ABDOMINAL WOUND CLOSURE, CREATION OF A COLOSTOMY;  Surgeon: Mateo Sargent MD;  [facility];  Service: General   • NV CREATE LESION STRTCTC PRQ NEUROLYTIC GASSERIAN Left 06/21/2016    Procedure:  V3 TRIGEMINAL RF RHIZOTOMY;  Surgeon: Bird Rosa MD;  [facility];  Service: Neurosurgery   • NV CREATE LESION STRTCTC PRQ NEUROLYTIC GASSERIAN Left 01/15/2019    Procedure: RHIZOTOMY TRIGEMINAL NERVES (V2 & V3), LEFT;  Surgeon: Ac Pemberton MD;  [facility];  Service: Neurosurgery   • NV CREATE LESION STRTCTC PRQ NEUROLYTIC GASSERIAN Right 04/02/2019    Procedure: RHIZOTOMY TRIGEMINAL NERVES (V2 & V3), RIGHT;  Surgeon: Ac Pemberton MD;  [facility];  Service: Neurosurgery   • NV DSTRJ TRIGEMINAL NRV SUPRAORB INFRAORB BRANCH Right 03/11/2016    Procedure: RIGHT SIDED  TRIGEMINAL V2 RADIOFREQUENCY RHIZOTOMY;  Surgeon: Bird Rosa MD;  [facility];  Service: Neurosurgery   • NV LAPS CLSR NTRSTM LG/SM INT W/RESCJ & ANASTOMOSIS N/A 8/9/2023    Procedure: LAPAROSCOPIC-CONVERTED TO OPEN CLOSURE COLOSTOMY, HEPATIC FLEXURE MOBILIZATION, COLORECTAL ANASTOMOSIS;  Surgeon: Odette Bonilla MD;  [facility];  Service: General   • NV SIGMOIDOSCOPY FLX DX W/COLLJ SPEC BR/WA IF PFRMD N/A 11/09/2022    Procedure: Flexible sigmoidoscopy,  diagnostic colonoscopy;  Surgeon: Heriberto Shay DO;  [facility];  Service: General   • RHIZOTOMY Left    • TONSILLECTOMY AND ADENOIDECTOMY     • TUBAL LIGATION       Social History     Social History Narrative     With Milton since 2004. .     36-year-old daughter, Angélica.  2 grandchildren.  3 and 5 as of 9/25.    He works at [employer].    On disability for MS for a long time.    Got  4/12/24 after a 20 year romance.     Medications[2]  Allergies   Allergen Reactions   • Ace Inhibitors Angioedema     Hx of angioedema - unclear if caused by ACE inhibitors, but highly recommend avoiding.   • Avonex [Interferon Beta-1a]  "Other (See Comments)     Per pt \"Med for MS passed out-fainted\"   • Cephalosporins Swelling     DO NOT GIVE BETA LACTAMS.Patient with delayed angioedema, cefepime with potential cause.  Additionally other medications like Lovenox also considered. Then with second episode of angioedema, less intense than previous, again delayed response after again having a dose of Cefepime   • Penicillins Rash     DO NOT GIVE BETA LACTAMS. Extensive total body rash occurring after Zosyn. Patient reported previous rash, mild, with Keflex.   • Keppra [Levetiracetam] Itching and Other (See Comments)     syncope   • Bactrim [Sulfamethoxazole-Trimethoprim] Itching     Other reaction(s): Itchy all over and rash.   • Barium Sulfate Rash and Edema   • Keppra [Levetiracetam] Rash and Edema   • Vancomycin Hives     2/2023 admission patient developed delayed hypersensitivity with rash and then subsequent angioedema on 2/22/2023.  Higher suspicion for reaction to cephalosporins.  Could consider retrial of vancomycin in the future.     Immunization History   Administered Date(s) Administered   • COVID-19 PFIZER VACCINE 0.3 ML IM 03/11/2021, 04/01/2021, 12/06/2021   • COVID-19 Pfizer Vac BIVALENT Parag-sucrose 12 Yr+ IM 03/21/2023   • COVID-19 Pfizer mRNA vacc PF parag-sucrose 12 yr and older (Comirnaty) 09/17/2024   • COVID-19 Pfizer vac (Parag-sucrose, gray cap) 12 yr+ IM 03/11/2021, 04/01/2021   • Influenza Recombinant Preservative Free Im 12/17/2024   • Influenza, injectable, quadrivalent, preservative free 0.5 mL 12/14/2023   • Tdap 08/16/2021   • Tuberculin Skin Test 03/15/2023   • Zoster Vaccine Recombinant 08/16/2021, 12/14/2023     Objective   /100 (BP Location: Left arm, Patient Position: Sitting, Cuff Size: Standard)   Pulse 78   Temp (!) 97.3 °F (36.3 °C) (Temporal)   Resp 18   Ht 5' 6\" (1.676 m)   Wt 74.1 kg (163 lb 6.4 oz)   LMP 03/11/2011   SpO2 97%   BMI 26.37 kg/m²     Physical Exam  Repeat blood pressure " "150/100.  Appears well.  Lungs are clear.  Heart regular.  Abdomen soft and nontender.  Walking with her walker.  Mood is upbeat.             [1]  Past Medical History:  Diagnosis Date   • Anemia    • Angioedema 2022   • Balance problem     per pt related to her \"MS\"   • Bladder infection     1/3/23 per pt no longer a problem\"   • Braces as ambulation aid     right ankle/leg when walking   • Cellulitis of right foot 2022   • Cognitive impairment 2022    Some short-term impairment   • Colostomy in place ([facility])    • Essential hypertension 2020   • Family history of breast cancer     per pt \"Mom  from it\"   • Gait instability    • GERD  2018   • History of CVA (cerebrovascular accident) 2022    4mini strokes found on MRI.  Not hospitalized.  Early .   • History of transfusion    • Hyperlipidemia 2020   • Hyponatremia    • IBS (irritable bowel syndrome)    • Ischemic colitis ([facility]) 2022   • Mini stroke    • Multiple sclerosis ([facility])    • Poor vision     Secondary to MS   • Right foot drop    • Risk for falls    • SIADH (syndrome of inappropriate ADH production) ([facility]) 2015   • Trigeminal neuralgia of right side of face 03/10/2014   • Vitamin D deficiency    • Walker as ambulation aid    [2]  Current Outpatient Medications on File Prior to Visit   Medication Sig   • acetaminophen (TYLENOL) 325 mg tablet Take 2 tablets (650 mg total) by mouth every 6 (six) hours as needed for mild pain   • atorvastatin (LIPITOR) 40 mg tablet TAKE 1 TABLET BY MOUTH EVERY DAY IN THE EVENING   • Bioflavonoid Products (Vitamin C ER) 1000-100 MG TBCR Take 1,000 mg by mouth 1 (one) time   • Cholecalciferol (Vitamin D3) 1000 units CAPS Take 1,000 Units by mouth 1 (one) time   • clopidogrel (PLAVIX) 75 mg tablet TAKE 1 TABLET BY MOUTH EVERY DAY   • diphenhydrAMINE (Banophen) 25 mg capsule Take 1 capsule (25 mg total) by mouth every 8 (eight) hours as needed for itching   • DULoxetine " (CYMBALTA) 60 mg delayed release capsule Take 1 capsule (60 mg total) by mouth daily   • famotidine (PEPCID) 20 mg tablet TAKE 1 TABLET BY MOUTH TWICE A DAY   • ferrous sulfate 325 (65 Fe) mg tablet 1 tablet every other day   • OXcarbazepine (TRILEPTAL) 600 mg tablet Take 1 tablet (600 mg total) by mouth every 8 (eight) hours   • oxybutynin (DITROPAN) 5 mg tablet TAKE 1 TABLET BY MOUTH TWICE A DAY   • sodium chloride 1 g tablet Take 2 tablets (2 g total) by mouth every 6 (six) hours

## 2025-06-25 NOTE — PROGRESS NOTES
Name: Rose Hammer      : 1960      MRN: [id number]  Encounter Provider: Chong López MD  Encounter Date: 2025   Encounter department: [facility]  :  Assessment & Plan  Medicare annual wellness visit, subsequent         Multiple sclerosis    History of CVA (cerebrovascular accident)    Trigeminal neuralgia    Chronic hyponatremia    SIADH (syndrome of inappropriate ADH production) (HCC)    Gastroesophageal reflux disease, unspecified whether esophagitis present    Gait instability    Hyperlipidemia, unspecified hyperlipidemia type       Preventive health issues were discussed with patient, and age appropriate screening tests were ordered as noted in patient's After Visit Summary. Personalized health advice and appropriate referrals for health education or preventive services given if needed, as noted in patient's After Visit Summary.    History of Present Illness     HPI   Patient Care Team:  Chong López MD as PCP - General (Family Medicine)  [facility] as PCP - PCP-[facility] ([facility]  MD Jacques Dobbins DO Francine Stettner Delin, CRNP Kathleen McFarland, PA-C Steven Falowski, MD Bruce George Thorkildsen, MD as Consulting Physician (Internal Medicine)  Chong López MD (Family Medicine)  [facility]  Lianet Leyva MD (Nephrology)  Ladi Robb PA-C (Gastroenterology)  [facility] as Nurse Practitioner (Gastroenterology)    Review of Systems  Medical History Reviewed by provider this encounter:  Tobacco  Allergies  Meds  Problems  Med Hx  Surg Hx  Fam Hx       Annual Wellness Visit Questionnaire   Rose is here for her Subsequent Wellness visit.     Health Risk Assessment:   Patient rates overall health as good. Patient feels that their physical health rating is much better. Patient is satisfied with their life. Eyesight was rated as same. Hearing was rated as same. Patient feels that their  emotional and mental health rating is same. Patients states they are never, rarely angry. Patient states they are sometimes unusually tired/fatigued. Pain experienced in the last 7 days has been none. Patient states that she has experienced no weight loss or gain in last 6 months.     Depression Screening:   PHQ-2 Score: 0      Fall Risk Screening:   In the past year, patient has experienced: history of falling in past year    Number of falls: 2 or more  Injured during fall?: No    Feels unsteady when standing or walking?: Yes    Worried about falling?: No      Urinary Incontinence Screening:   Patient has leaked urine accidently in the last six months.     Home Safety:  Patient has trouble with stairs inside or outside of their home. Patient has working smoke alarms and has working carbon monoxide detector. Home safety hazards include: none.     Nutrition:   Current diet is Regular.     Medications:   Patient is currently taking over-the-counter supplements. OTC medications include: see medication list. Patient is able to manage medications.     Activities of Daily Living (ADLs)/Instrumental Activities of Daily Living (IADLs):   Walk and transfer into and out of bed and chair?: Yes  Dress and groom yourself?: Yes    Bathe or shower yourself?: Yes    Feed yourself? Yes  Do your laundry/housekeeping?: No  Manage your money, pay your bills and track your expenses?: No  Make your own meals?: No    Do your own shopping?: No    Previous Hospitalizations:   Any hospitalizations or ED visits within the last 12 months?: Yes    How many hospitalizations have you had in the last year?: more than 4    Advance Care Planning:   Living will: Yes    Durable POA for healthcare: Yes    Advanced directive: Yes      Preventive Screenings      Cardiovascular Screening:    General: Screening Not Indicated and History Lipid Disorder      Diabetes Screening:     General: Screening Current      Colorectal Cancer Screening:     General:  "Screening Current      Breast Cancer Screening:     General: Screening Current      Cervical Cancer Screening:    General: Screening Current      Lung Cancer Screening:     General: Screening Not Indicated    Immunizations:  - Immunizations due: Prevnar 20    Screening, Brief Intervention, and Referral to Treatment (SBIRT)     Screening  Typical number of drinks in a day: 0  Typical number of drinks in a week: 0  Interpretation: Low risk drinking behavior.    Single Item Drug Screening:  How often have you used an illegal drug (including marijuana) or a prescription medication for non-medical reasons in the past year? never    Single Item Drug Screen Score: 0  Interpretation: Negative screen for possible drug use disorder    Social Drivers of Health     Financial Resource Strain: Low Risk  (12/9/2022)    Overall Financial Resource Strain (CARDIA)    • Difficulty of Paying Living Expenses: Not hard at all   Food Insecurity: No Food Insecurity (6/25/2025)    Nursing - Inadequate Food Risk Classification    • Worried About Running Out of Food in the Last Year: Never true    • Ran Out of Food in the Last Year: Never true   Transportation Needs: No Transportation Needs (6/25/2025)    PRAPARE - Transportation    • Lack of Transportation (Medical): No    • Lack of Transportation (Non-Medical): No   Housing Stability: Low Risk  (6/25/2025)    Housing Stability Vital Sign    • Unable to Pay for Housing in the Last Year: No    • Number of Times Moved in the Last Year: 0    • Homeless in the Last Year: No   Utilities: Not At Risk (6/25/2025)    [facility] Utilities    • Threatened with loss of utilities: No     No results found.    Objective   /100 (BP Location: Left arm, Patient Position: Sitting, Cuff Size: Standard)   Pulse 78   Temp (!) 97.3 °F (36.3 °C) (Temporal)   Resp 18   Ht 5' 6\" (1.676 m)   Wt 74.1 kg (163 lb 6.4 oz)   LMP 03/11/2011   SpO2 97%   BMI 26.37 kg/m²     Physical Exam    "

## 2025-07-14 ENCOUNTER — TELEPHONE (OUTPATIENT)
Dept: NEPHROLOGY | Facility: CLINIC | Age: 65
End: 2025-07-14

## 2025-07-14 ENCOUNTER — APPOINTMENT (OUTPATIENT)
Dept: LAB | Facility: CLINIC | Age: 65
End: 2025-07-14
Attending: INTERNAL MEDICINE
Payer: COMMERCIAL

## 2025-07-14 DIAGNOSIS — N18.1 CKD (CHRONIC KIDNEY DISEASE) STAGE 1, GFR 90 ML/MIN OR GREATER: ICD-10-CM

## 2025-07-14 DIAGNOSIS — E78.5 HYPERLIPIDEMIA, UNSPECIFIED HYPERLIPIDEMIA TYPE: ICD-10-CM

## 2025-07-14 DIAGNOSIS — E87.1 CHRONIC HYPONATREMIA: ICD-10-CM

## 2025-07-14 DIAGNOSIS — E22.2 SIADH (SYNDROME OF INAPPROPRIATE ADH PRODUCTION) (HCC): ICD-10-CM

## 2025-07-14 DIAGNOSIS — E55.9 VITAMIN D DEFICIENCY: ICD-10-CM

## 2025-07-14 LAB
ALBUMIN SERPL BCG-MCNC: 4.1 G/DL (ref 3.5–5)
ANION GAP SERPL CALCULATED.3IONS-SCNC: 8 MMOL/L (ref 4–13)
BUN SERPL-MCNC: 11 MG/DL (ref 5–25)
CALCIUM SERPL-MCNC: 9.5 MG/DL (ref 8.4–10.2)
CHLORIDE SERPL-SCNC: 103 MMOL/L (ref 96–108)
CO2 SERPL-SCNC: 28 MMOL/L (ref 21–32)
CREAT SERPL-MCNC: 0.51 MG/DL (ref 0.6–1.3)
GFR SERPL CREATININE-BSD FRML MDRD: 103 ML/MIN/1.73SQ M
GLUCOSE SERPL-MCNC: 78 MG/DL (ref 65–140)
PHOSPHATE SERPL-MCNC: 2.8 MG/DL (ref 2.3–4.1)
POTASSIUM SERPL-SCNC: 4.4 MMOL/L (ref 3.5–5.3)
SODIUM SERPL-SCNC: 139 MMOL/L (ref 135–147)

## 2025-07-14 PROCEDURE — 80069 RENAL FUNCTION PANEL: CPT

## 2025-07-14 PROCEDURE — 36415 COLL VENOUS BLD VENIPUNCTURE: CPT

## 2025-07-14 NOTE — TELEPHONE ENCOUNTER
LM for pt as a reminder lab work due to be completed prior to upcoming office visit - asked pt to contact office with any questions

## 2025-07-15 ENCOUNTER — OFFICE VISIT (OUTPATIENT)
Dept: NEPHROLOGY | Facility: CLINIC | Age: 65
End: 2025-07-15
Payer: COMMERCIAL

## 2025-07-15 VITALS
SYSTOLIC BLOOD PRESSURE: 128 MMHG | HEART RATE: 81 BPM | BODY MASS INDEX: 26.63 KG/M2 | OXYGEN SATURATION: 95 % | WEIGHT: 165 LBS | DIASTOLIC BLOOD PRESSURE: 84 MMHG

## 2025-07-15 DIAGNOSIS — G50.0 TRIGEMINAL NEURALGIA: ICD-10-CM

## 2025-07-15 DIAGNOSIS — D64.9 CHRONIC ANEMIA: ICD-10-CM

## 2025-07-15 DIAGNOSIS — E22.2 SIADH (SYNDROME OF INAPPROPRIATE ADH PRODUCTION) (HCC): Primary | ICD-10-CM

## 2025-07-15 DIAGNOSIS — N18.1 CKD (CHRONIC KIDNEY DISEASE) STAGE 1, GFR 90 ML/MIN OR GREATER: ICD-10-CM

## 2025-07-15 DIAGNOSIS — E78.5 HYPERLIPIDEMIA, UNSPECIFIED HYPERLIPIDEMIA TYPE: ICD-10-CM

## 2025-07-15 PROCEDURE — 99213 OFFICE O/P EST LOW 20 MIN: CPT | Performed by: INTERNAL MEDICINE

## 2025-07-24 ENCOUNTER — RA CDI HCC (OUTPATIENT)
Dept: OTHER | Facility: HOSPITAL | Age: 65
End: 2025-07-24

## 2025-08-20 ENCOUNTER — OFFICE VISIT (OUTPATIENT)
Dept: FAMILY MEDICINE CLINIC | Facility: CLINIC | Age: 65
End: 2025-08-20
Payer: COMMERCIAL

## 2025-08-20 VITALS
RESPIRATION RATE: 18 BRPM | SYSTOLIC BLOOD PRESSURE: 136 MMHG | OXYGEN SATURATION: 97 % | DIASTOLIC BLOOD PRESSURE: 98 MMHG | TEMPERATURE: 98 F | HEART RATE: 74 BPM | WEIGHT: 159.8 LBS | BODY MASS INDEX: 25.79 KG/M2

## 2025-08-20 DIAGNOSIS — R26.81 GAIT INSTABILITY: ICD-10-CM

## 2025-08-20 DIAGNOSIS — K21.9 GASTROESOPHAGEAL REFLUX DISEASE, UNSPECIFIED WHETHER ESOPHAGITIS PRESENT: Chronic | ICD-10-CM

## 2025-08-20 DIAGNOSIS — G50.0 TRIGEMINAL NEURALGIA: ICD-10-CM

## 2025-08-20 DIAGNOSIS — E87.1 CHRONIC HYPONATREMIA: Primary | ICD-10-CM

## 2025-08-20 DIAGNOSIS — E78.5 HYPERLIPIDEMIA, UNSPECIFIED HYPERLIPIDEMIA TYPE: ICD-10-CM

## 2025-08-20 DIAGNOSIS — Z86.73 HISTORY OF CVA (CEREBROVASCULAR ACCIDENT): ICD-10-CM

## 2025-08-20 DIAGNOSIS — G35 MULTIPLE SCLEROSIS (HCC): Chronic | ICD-10-CM

## 2025-08-20 PROCEDURE — G2211 COMPLEX E/M VISIT ADD ON: HCPCS | Performed by: FAMILY MEDICINE

## 2025-08-20 PROCEDURE — 99214 OFFICE O/P EST MOD 30 MIN: CPT | Performed by: FAMILY MEDICINE
